# Patient Record
Sex: FEMALE | Race: WHITE | NOT HISPANIC OR LATINO | Employment: OTHER | URBAN - NONMETROPOLITAN AREA
[De-identification: names, ages, dates, MRNs, and addresses within clinical notes are randomized per-mention and may not be internally consistent; named-entity substitution may affect disease eponyms.]

---

## 2017-04-25 ENCOUNTER — OFFICE VISIT (OUTPATIENT)
Dept: ORTHOPEDIC SURGERY | Facility: CLINIC | Age: 37
End: 2017-04-25

## 2017-04-25 VITALS
WEIGHT: 276 LBS | HEIGHT: 66 IN | SYSTOLIC BLOOD PRESSURE: 128 MMHG | BODY MASS INDEX: 44.36 KG/M2 | DIASTOLIC BLOOD PRESSURE: 74 MMHG

## 2017-04-25 DIAGNOSIS — R20.0 BILATERAL HAND NUMBNESS: Primary | ICD-10-CM

## 2017-04-25 DIAGNOSIS — G56.22 ENTRAPMENT OF LEFT ULNAR NERVE: ICD-10-CM

## 2017-04-25 DIAGNOSIS — G56.21 ENTRAPMENT OF RIGHT ULNAR NERVE: ICD-10-CM

## 2017-04-25 DIAGNOSIS — G56.03 BILATERAL CARPAL TUNNEL SYNDROME: ICD-10-CM

## 2017-04-25 PROCEDURE — 99203 OFFICE O/P NEW LOW 30 MIN: CPT | Performed by: ORTHOPAEDIC SURGERY

## 2017-04-25 RX ORDER — CETIRIZINE HYDROCHLORIDE 10 MG/1
TABLET ORAL
Refills: 2 | COMMUNITY
Start: 2017-02-03 | End: 2017-07-14

## 2017-04-25 RX ORDER — FLUTICASONE PROPIONATE 50 MCG
SPRAY, SUSPENSION (ML) NASAL
Refills: 5 | COMMUNITY
Start: 2017-02-03 | End: 2017-07-14 | Stop reason: SDUPTHER

## 2017-04-25 RX ORDER — CEFDINIR 300 MG/1
CAPSULE ORAL
Refills: 0 | COMMUNITY
Start: 2017-01-16 | End: 2017-07-14

## 2017-04-25 RX ORDER — LISINOPRIL 10 MG/1
10 TABLET ORAL DAILY
COMMUNITY
Start: 2017-03-29

## 2017-04-25 NOTE — PROGRESS NOTES
"Subjective   Erica Kan is a 36 y.o. female. 1980- Bilateral hand- \"numbness, stinging and burning\"        History of Present Illness   Patient is here for consult- bilateral hand. Patient states that 5-6 years ago she had a bilateral hand EMG conduction study that showed bilateral carpal tunnel but was not able to proceed with surgery at that time. Patient states that last week while at the park with her grandchildren she placed her right hand on her hip and her right hand \"instantly became numb\". Patient states that since that day the feeling in her last 3 digits have been \"numb with sharp shooting pains\" that go up to her right elbow. Patient states that at night her bilateral shoulder/arms become numb. Patient states that she has tried wearing bilateral hand braces at night but did not bring any relief. Patient would like to be referred to Neurology for EMG to bilateral hands in order to proceed with surgery.     The following portions of the patient's history were reviewed and updated as appropriate:   She  has a past medical history of Gout and Hypertension.  She  does not have any pertinent problems on file.  She  has a past surgical history that includes Appendectomy; Cholecystectomy; and  section, classic.  Her family history includes Allergies in her father and mother; Cholelithiasis in her mother; Diabetes in her mother; Hypertension in her mother.  She  reports that she has been smoking Cigarettes.  She has a 9.50 pack-year smoking history. She has never used smokeless tobacco. She reports that she does not drink alcohol or use illicit drugs.  Current Outpatient Prescriptions   Medication Sig Dispense Refill   • cefdinir (OMNICEF) 300 MG capsule TAKE 1 CAPSULE BY MOUTH TWICE A DAY  0   • cetirizine (zyrTEC) 10 MG tablet TAKE 1 TABLET 1 TIME PER DAY  2   • fluticasone (FLONASE) 50 MCG/ACT nasal spray USE 1 SPRAY 1 TIME PER DAY  5   • lisinopril (PRINIVIL,ZESTRIL) 10 MG tablet      • " "metoprolol tartrate (LOPRESSOR) 25 MG tablet        No current facility-administered medications for this visit.      No current outpatient prescriptions on file prior to visit.     No current facility-administered medications on file prior to visit.      She has No Known Allergies..    Review of Systems  REVIEW OF SYSTEMS:  Negative, other than presenting complaint.  HEENT: No headaches, diplopia, blurred vision, tinnitus, vertigo, epistaxis, hoarseness or sore throat.  Pulmonary: No cough, sputum, hemoptysis, dyspnea, wheezing, or chest pain.  Cardiac: No chest pain, palpitations, orthopnea, paroxysmal nocturnal dyspnea, shortness of breath, or pedal edema.  Gastrointestinal: No diarrhea, melena, or constipation.  Genitourinary: No dysuria, hematuria, nocturia, frequency, bladder or bowel incontinence.  Hematology: No history of any anemia, fatigue, fever, or chills or night sweats.  Dermatology: No rashes, pruritus, or increased pigmentation changes of the skin.     Objective   Physical Exam  /74 (BP Location: Right arm, Patient Position: Sitting)  Ht 66\" (167.6 cm)  Wt 276 lb (125 kg)  BMI 44.55 kg/m2    Social History     Social History   • Marital status:      Spouse name: N/A   • Number of children: N/A   • Years of education: N/A     Occupational History   • Not on file.     Social History Main Topics   • Smoking status: Current Every Day Smoker     Packs/day: 0.50     Years: 19.00     Types: Cigarettes   • Smokeless tobacco: Never Used   • Alcohol use No   • Drug use: No   • Sexual activity: Defer     Other Topics Concern   • Not on file     Social History Narrative   • No narrative on file       HEENT: Normocephalic.  PERRLA.  TM's clear bilaterally.  Oropharynx: Clear.  Neck: Supple, with no adenopathy.  Chest: Equal bilateral expansion.  Clear to auscultation and percussion.  Heart: Regular sinus rhythm, S1 and S2 normal.  No murmurs or extra heart sounds heard.  Abdomen: Soft, nontender, " and no organomegaly.  Neurological: cranial nerves II-XII normal Vascular: pulses are present  Dermatological: no rashes  or blemishes, or any abnormality of the skin.    Examination of bilateral hand shows bilateral positive tinel sign. And positive compression tests bilateral . There is also  A tinel sign at the rt elbow.          Ambulatory referral to Neurology- Dr. Devine's office will be sent for- EMG bilateral hand with ulnar nerve compression. Patient is to return to my office after the EMG has been obtained to discuss possible treatment options. proflex splints given.     Assessment/Plan   Problems Addressed this Visit        Nervous and Auditory    Bilateral carpal tunnel syndrome    Relevant Orders    Ambulatory Referral to Neurology (Completed)    Bilateral hand numbness - Primary    Relevant Orders    Ambulatory Referral to Neurology (Completed)    Entrapment of right ulnar nerve    Relevant Orders    Ambulatory Referral to Neurology (Completed)    Entrapment of left ulnar nerve    Relevant Orders    Ambulatory Referral to Neurology (Completed)

## 2017-06-29 ENCOUNTER — OFFICE VISIT (OUTPATIENT)
Dept: ORTHOPEDIC SURGERY | Facility: CLINIC | Age: 37
End: 2017-06-29

## 2017-06-29 VITALS — HEIGHT: 66 IN | BODY MASS INDEX: 45.16 KG/M2 | WEIGHT: 281 LBS

## 2017-06-29 DIAGNOSIS — G56.22 ENTRAPMENT OF LEFT ULNAR NERVE: ICD-10-CM

## 2017-06-29 DIAGNOSIS — G56.21 ENTRAPMENT OF RIGHT ULNAR NERVE: ICD-10-CM

## 2017-06-29 DIAGNOSIS — G56.03 BILATERAL CARPAL TUNNEL SYNDROME: Primary | ICD-10-CM

## 2017-06-29 DIAGNOSIS — R20.0 BILATERAL HAND NUMBNESS: ICD-10-CM

## 2017-06-29 PROCEDURE — 99214 OFFICE O/P EST MOD 30 MIN: CPT | Performed by: ORTHOPAEDIC SURGERY

## 2017-06-29 RX ORDER — CYCLOBENZAPRINE HCL 5 MG
TABLET ORAL
Refills: 0 | COMMUNITY
Start: 2017-06-21 | End: 2017-07-14

## 2017-06-29 RX ORDER — IBUPROFEN 800 MG/1
TABLET ORAL
Refills: 0 | COMMUNITY
Start: 2017-06-21 | End: 2017-07-14

## 2017-06-29 NOTE — H&P
"Erica Kan is a 36 y.o. female returns for     Chief Complaint   Patient presents with   • Left Hand - Follow-up   • Right Hand - Follow-up   • Results     EMG-NCS       HISTORY OF PRESENT ILLNESS: Patient would like to schedule bilat carpal tunnel release.       CONCURRENT MEDICAL HISTORY:    Past Medical History:   Diagnosis Date   • Gout    • Hypertension        No Known Allergies      Current Outpatient Prescriptions:   •  cefdinir (OMNICEF) 300 MG capsule, TAKE 1 CAPSULE BY MOUTH TWICE A DAY, Disp: , Rfl: 0  •  cetirizine (zyrTEC) 10 MG tablet, TAKE 1 TABLET 1 TIME PER DAY, Disp: , Rfl: 2  •  cyclobenzaprine (FLEXERIL) 5 MG tablet, TAKE 1 AND 1/2 TABLETS BY MOUTH THREE TIMES DAILY AS NEEDED., Disp: , Rfl: 0  •  fluticasone (FLONASE) 50 MCG/ACT nasal spray, USE 1 SPRAY 1 TIME PER DAY, Disp: , Rfl: 5  •  ibuprofen (ADVIL,MOTRIN) 800 MG tablet, 1 TABLET BY MOUTH THREE TIMES A DAY, Disp: , Rfl: 0  •  lisinopril (PRINIVIL,ZESTRIL) 10 MG tablet, , Disp: , Rfl:   •  metoprolol tartrate (LOPRESSOR) 25 MG tablet, , Disp: , Rfl:     Past Surgical History:   Procedure Laterality Date   • APPENDECTOMY     •  SECTION      3   • CHOLECYSTECTOMY         ROS  No fevers or chills.  No chest pain or shortness of air.  No GI or  disturbances.    PHYSICAL EXAMINATION:       Ht 66\" (167.6 cm)  Wt 281 lb (127 kg)  BMI 45.35 kg/m2    Physical Exam    Ht 66\" (167.6 cm)  Wt 281 lb (127 kg)  BMI 45.35 kg/m2    Social History     Social History   • Marital status:      Spouse name: N/A   • Number of children: N/A   • Years of education: N/A     Occupational History   • Not on file.     Social History Main Topics   • Smoking status: Current Every Day Smoker     Packs/day: 0.50     Years: 19.00     Types: Cigarettes   • Smokeless tobacco: Never Used   • Alcohol use No   • Drug use: No   • Sexual activity: Defer     Other Topics Concern   • Not on file     Social History Narrative       HEENT: Normocephalic.  PERRLA.  " TM's clear bilaterally.  Oropharynx: Clear.  Neck: Supple, with no adenopathy.  Chest: Equal bilateral expansion.  Clear to auscultation and percussion.  Heart: Regular sinus rhythm, S1 and S2 normal.  No murmurs or extra heart sounds heard.  Abdomen: Soft, nontender, and no organomegaly.  Neurological: cranial nerves II-XII normal Vascular: pulses are present  Dermatological: no rashes  or blemishes, or any abnormality of the skin.          GAIT:     []  Normal  []  Antalgic    Assistive device: []  None  []  Walker     []  Crutches  []  Cane     []  Wheelchair  []  Stretcher    Ortho Exam   Positive tinel at the wrist , positive compression test. Abductor pollicis moderately weak. Capillary refill intact.  Findings present in both  Wrists.       EMG-NCS conclusion:  Moderately severe bilateral carpal tunnel syndrome. Suggest clinical correlation.        ASSESSMENT:    Diagnoses and all orders for this visit:    Bilateral carpal tunnel syndrome    Bilateral hand numbness    Entrapment of right ulnar nerve    Entrapment of left ulnar nerve    Other orders  -     cyclobenzaprine (FLEXERIL) 5 MG tablet; TAKE 1 AND 1/2 TABLETS BY MOUTH THREE TIMES DAILY AS NEEDED.  -     ibuprofen (ADVIL,MOTRIN) 800 MG tablet; 1 TABLET BY MOUTH THREE TIMES A DAY          PLAN release of rt carpal tunnel since rt hand is more symptomatic.  SURGERY RISKS    Procedure has been explained to the patient.  Risks and benefits have been explained, including the risk of bleeding, stiffness, recurrent problems, blood vessel or nerve injury, blood clots, infection, and lack of healing.  Patient understands and agrees to the procedure, and we will proceed ahead with this, as an outpatient under general anesthesia, on    7/19/17      No Follow-up on file.    Thompson Rose MD

## 2017-07-14 ENCOUNTER — APPOINTMENT (OUTPATIENT)
Dept: PREADMISSION TESTING | Facility: HOSPITAL | Age: 37
End: 2017-07-14

## 2017-07-14 VITALS
SYSTOLIC BLOOD PRESSURE: 138 MMHG | BODY MASS INDEX: 45 KG/M2 | WEIGHT: 280 LBS | RESPIRATION RATE: 16 BRPM | OXYGEN SATURATION: 99 % | HEART RATE: 67 BPM | DIASTOLIC BLOOD PRESSURE: 78 MMHG | HEIGHT: 66 IN

## 2017-07-14 PROCEDURE — 93005 ELECTROCARDIOGRAM TRACING: CPT

## 2017-07-14 PROCEDURE — 93010 ELECTROCARDIOGRAM REPORT: CPT | Performed by: INTERNAL MEDICINE

## 2017-07-14 RX ORDER — PHENTERMINE HYDROCHLORIDE 37.5 MG/1
37.5 CAPSULE ORAL EVERY MORNING
COMMUNITY
End: 2019-11-27 | Stop reason: HOSPADM

## 2017-07-14 RX ORDER — SODIUM CHLORIDE, SODIUM GLUCONATE, SODIUM ACETATE, POTASSIUM CHLORIDE, AND MAGNESIUM CHLORIDE 526; 502; 368; 37; 30 MG/100ML; MG/100ML; MG/100ML; MG/100ML; MG/100ML
1000 INJECTION, SOLUTION INTRAVENOUS CONTINUOUS PRN
Status: CANCELLED | OUTPATIENT
Start: 2017-07-19

## 2017-07-14 NOTE — DISCHARGE INSTRUCTIONS
[facility]  Pre-op Information and Guidelines    You will be called after 2 p.m. the day before your surgery (Friday for Monday surgery) and notified of your time for arrival and approximate surgery time.  If you have not received a call by 4P.M., please contact Same Day Surgery at [phone] of if outside [facility] call [phone].    Please Follow these Important Safety Guidelines:    • The morning of your procedure, take only the medications listed below with   A sip of water:_____________________________________________       ______________________________________________    • DO NOT eat or drink anything after 12:00 midnight the night before surgery  Specific instructions concerning drinking clear liquids will be discussed during  the pre-surgery instruction call the day before your surgery.    • If you take a blood thinner (ex. Plavix, Coumadin, aspirin), ask your doctor when to stop it before surgery  STOP DATE: _________________    • Only 2 visitors are allowed in patient rooms at a time  Your visitors will be asked to wait in the lobby until the admission process is complete with the exception of a parent with a child and patients in need of special assistance.    • YOU CANNOT DRIVE YOURSELF HOME  You must be accompanied by someone who will be responsible for driving you home after surgery and for your care at home.    • DO NOT chew gum, use breath mints, hard candy, or smoke the day of surgery  • DO NOT drink alcohol for at least 24 hours before your surgery  • DO NOT wear any jewelry and remove all body piercing before coming to the hospital  • DO NOT wear make-up to the hospital  • If you are having surgery on an extremity (arm/leg/foot) remove nail polish/artificial nails on the surgical side  • Clothing, glasses, contacts, dentures, and hairpieces must be removed before surgery  • Bathe the night before or the morning of your surgery and do not use powders/lotions on  skin.

## 2017-07-14 NOTE — PAT
Read EKG printout to DR. Huerta over the phone also informed him that patient states she was having palpations in March saw a heart doctor Dr. Panchito Maier that they did an echo and stress test and the only thing he found was the wall of the heart thickened, told her she was ok and did not have to return for a follow up visit, no further orders or instructions per Dr. Huerta may proceed with scheduled surgery.

## 2017-07-19 ENCOUNTER — ANESTHESIA (OUTPATIENT)
Dept: PERIOP | Facility: HOSPITAL | Age: 37
End: 2017-07-19

## 2017-07-19 ENCOUNTER — HOSPITAL ENCOUNTER (OUTPATIENT)
Facility: HOSPITAL | Age: 37
Setting detail: HOSPITAL OUTPATIENT SURGERY
Discharge: HOME | End: 2017-07-19
Attending: ORTHOPAEDIC SURGERY | Admitting: ORTHOPAEDIC SURGERY

## 2017-07-19 ENCOUNTER — ANESTHESIA EVENT (OUTPATIENT)
Dept: PERIOP | Facility: HOSPITAL | Age: 37
End: 2017-07-19

## 2017-07-19 VITALS
SYSTOLIC BLOOD PRESSURE: 161 MMHG | WEIGHT: 278 LBS | HEIGHT: 66 IN | OXYGEN SATURATION: 95 % | DIASTOLIC BLOOD PRESSURE: 91 MMHG | BODY MASS INDEX: 44.68 KG/M2 | HEART RATE: 71 BPM | TEMPERATURE: 96.9 F | RESPIRATION RATE: 18 BRPM

## 2017-07-19 DIAGNOSIS — G56.03 BILATERAL CARPAL TUNNEL SYNDROME: ICD-10-CM

## 2017-07-19 PROCEDURE — 25010000002 FENTANYL CITRATE (PF) 100 MCG/2ML SOLUTION: Performed by: NURSE ANESTHETIST, CERTIFIED REGISTERED

## 2017-07-19 PROCEDURE — 64721 CARPAL TUNNEL SURGERY: CPT | Performed by: ORTHOPAEDIC SURGERY

## 2017-07-19 PROCEDURE — 25010000002 PROPOFOL 10 MG/ML EMULSION: Performed by: NURSE ANESTHETIST, CERTIFIED REGISTERED

## 2017-07-19 PROCEDURE — 25010000003 CEFAZOLIN PER 500 MG: Performed by: ORTHOPAEDIC SURGERY

## 2017-07-19 PROCEDURE — 25010000002 ONDANSETRON PER 1 MG: Performed by: NURSE ANESTHETIST, CERTIFIED REGISTERED

## 2017-07-19 PROCEDURE — 25010000002 DEXAMETHASONE PER 1 MG: Performed by: NURSE ANESTHETIST, CERTIFIED REGISTERED

## 2017-07-19 PROCEDURE — 25010000002 MIDAZOLAM PER 1 MG: Performed by: NURSE ANESTHETIST, CERTIFIED REGISTERED

## 2017-07-19 PROCEDURE — 25010000002 HYDROMORPHONE PER 4 MG: Performed by: NURSE ANESTHETIST, CERTIFIED REGISTERED

## 2017-07-19 RX ORDER — LABETALOL HYDROCHLORIDE 5 MG/ML
5 INJECTION, SOLUTION INTRAVENOUS
Status: DISCONTINUED | OUTPATIENT
Start: 2017-07-19 | End: 2017-07-19 | Stop reason: HOSPADM

## 2017-07-19 RX ORDER — ACETAMINOPHEN 650 MG/1
650 SUPPOSITORY RECTAL ONCE AS NEEDED
Status: DISCONTINUED | OUTPATIENT
Start: 2017-07-19 | End: 2017-07-19 | Stop reason: HOSPADM

## 2017-07-19 RX ORDER — NALOXONE HCL 0.4 MG/ML
0.2 VIAL (ML) INJECTION AS NEEDED
Status: DISCONTINUED | OUTPATIENT
Start: 2017-07-19 | End: 2017-07-19 | Stop reason: HOSPADM

## 2017-07-19 RX ORDER — PROPOFOL 10 MG/ML
VIAL (ML) INTRAVENOUS AS NEEDED
Status: DISCONTINUED | OUTPATIENT
Start: 2017-07-19 | End: 2017-07-19 | Stop reason: SURG

## 2017-07-19 RX ORDER — HYDROCODONE BITARTRATE AND ACETAMINOPHEN 7.5; 325 MG/1; MG/1
1 TABLET ORAL EVERY 6 HOURS PRN
Qty: 40 TABLET | Refills: 0 | Status: SHIPPED | OUTPATIENT
Start: 2017-07-19 | End: 2019-11-27 | Stop reason: HOSPADM

## 2017-07-19 RX ORDER — DEXAMETHASONE SODIUM PHOSPHATE 4 MG/ML
INJECTION, SOLUTION INTRA-ARTICULAR; INTRALESIONAL; INTRAMUSCULAR; INTRAVENOUS; SOFT TISSUE AS NEEDED
Status: DISCONTINUED | OUTPATIENT
Start: 2017-07-19 | End: 2017-07-19 | Stop reason: SURG

## 2017-07-19 RX ORDER — ACETAMINOPHEN 325 MG/1
650 TABLET ORAL ONCE AS NEEDED
Status: DISCONTINUED | OUTPATIENT
Start: 2017-07-19 | End: 2017-07-19 | Stop reason: HOSPADM

## 2017-07-19 RX ORDER — ONDANSETRON 2 MG/ML
4 INJECTION INTRAMUSCULAR; INTRAVENOUS ONCE AS NEEDED
Status: DISCONTINUED | OUTPATIENT
Start: 2017-07-19 | End: 2017-07-19 | Stop reason: HOSPADM

## 2017-07-19 RX ORDER — BACITRACIN 50000 [IU]/1
INJECTION, POWDER, FOR SOLUTION INTRAMUSCULAR AS NEEDED
Status: DISCONTINUED | OUTPATIENT
Start: 2017-07-19 | End: 2017-07-19 | Stop reason: HOSPADM

## 2017-07-19 RX ORDER — SODIUM CHLORIDE, SODIUM GLUCONATE, SODIUM ACETATE, POTASSIUM CHLORIDE, AND MAGNESIUM CHLORIDE 526; 502; 368; 37; 30 MG/100ML; MG/100ML; MG/100ML; MG/100ML; MG/100ML
1000 INJECTION, SOLUTION INTRAVENOUS CONTINUOUS PRN
Status: DISCONTINUED | OUTPATIENT
Start: 2017-07-19 | End: 2017-07-19 | Stop reason: HOSPADM

## 2017-07-19 RX ORDER — MIDAZOLAM HYDROCHLORIDE 1 MG/ML
INJECTION INTRAMUSCULAR; INTRAVENOUS AS NEEDED
Status: DISCONTINUED | OUTPATIENT
Start: 2017-07-19 | End: 2017-07-19 | Stop reason: SURG

## 2017-07-19 RX ORDER — LIDOCAINE HYDROCHLORIDE 20 MG/ML
INJECTION, SOLUTION INFILTRATION; PERINEURAL AS NEEDED
Status: DISCONTINUED | OUTPATIENT
Start: 2017-07-19 | End: 2017-07-19 | Stop reason: SURG

## 2017-07-19 RX ORDER — FLUMAZENIL 0.1 MG/ML
0.2 INJECTION INTRAVENOUS AS NEEDED
Status: DISCONTINUED | OUTPATIENT
Start: 2017-07-19 | End: 2017-07-19 | Stop reason: HOSPADM

## 2017-07-19 RX ORDER — BACTERIOSTATIC SODIUM CHLORIDE 0.9 %
VIAL (ML) INJECTION AS NEEDED
Status: DISCONTINUED | OUTPATIENT
Start: 2017-07-19 | End: 2017-07-19 | Stop reason: HOSPADM

## 2017-07-19 RX ORDER — ONDANSETRON 2 MG/ML
INJECTION INTRAMUSCULAR; INTRAVENOUS AS NEEDED
Status: DISCONTINUED | OUTPATIENT
Start: 2017-07-19 | End: 2017-07-19 | Stop reason: SURG

## 2017-07-19 RX ORDER — FENTANYL CITRATE 50 UG/ML
INJECTION, SOLUTION INTRAMUSCULAR; INTRAVENOUS AS NEEDED
Status: DISCONTINUED | OUTPATIENT
Start: 2017-07-19 | End: 2017-07-19 | Stop reason: SURG

## 2017-07-19 RX ORDER — EPHEDRINE SULFATE 50 MG/ML
5 INJECTION, SOLUTION INTRAVENOUS ONCE AS NEEDED
Status: DISCONTINUED | OUTPATIENT
Start: 2017-07-19 | End: 2017-07-19 | Stop reason: HOSPADM

## 2017-07-19 RX ADMIN — FENTANYL CITRATE 50 MCG: 50 INJECTION, SOLUTION INTRAMUSCULAR; INTRAVENOUS at 10:17

## 2017-07-19 RX ADMIN — LIDOCAINE HYDROCHLORIDE 100 MG: 20 INJECTION, SOLUTION INFILTRATION; PERINEURAL at 09:50

## 2017-07-19 RX ADMIN — ONDANSETRON 4 MG: 2 INJECTION INTRAMUSCULAR; INTRAVENOUS at 10:34

## 2017-07-19 RX ADMIN — HYDROMORPHONE HYDROCHLORIDE 0.5 MG: 1 INJECTION, SOLUTION INTRAMUSCULAR; INTRAVENOUS; SUBCUTANEOUS at 11:44

## 2017-07-19 RX ADMIN — HYDROMORPHONE HYDROCHLORIDE 0.5 MG: 1 INJECTION, SOLUTION INTRAMUSCULAR; INTRAVENOUS; SUBCUTANEOUS at 11:49

## 2017-07-19 RX ADMIN — DEXAMETHASONE SODIUM PHOSPHATE 4 MG: 4 INJECTION, SOLUTION INTRAMUSCULAR; INTRAVENOUS at 09:57

## 2017-07-19 RX ADMIN — FENTANYL CITRATE 50 MCG: 50 INJECTION, SOLUTION INTRAMUSCULAR; INTRAVENOUS at 09:50

## 2017-07-19 RX ADMIN — PROPOFOL 150 MG: 10 INJECTION, EMULSION INTRAVENOUS at 09:50

## 2017-07-19 RX ADMIN — PROPOFOL 50 MG: 10 INJECTION, EMULSION INTRAVENOUS at 10:21

## 2017-07-19 RX ADMIN — CEFAZOLIN SODIUM 2 G: 1 INJECTION, POWDER, FOR SOLUTION INTRAMUSCULAR; INTRAVENOUS at 09:54

## 2017-07-19 RX ADMIN — HYDROMORPHONE HYDROCHLORIDE 0.5 MG: 1 INJECTION, SOLUTION INTRAMUSCULAR; INTRAVENOUS; SUBCUTANEOUS at 11:24

## 2017-07-19 RX ADMIN — HYDROMORPHONE HYDROCHLORIDE 0.5 MG: 1 INJECTION, SOLUTION INTRAMUSCULAR; INTRAVENOUS; SUBCUTANEOUS at 11:14

## 2017-07-19 RX ADMIN — SODIUM CHLORIDE, SODIUM GLUCONATE, SODIUM ACETATE, POTASSIUM CHLORIDE, AND MAGNESIUM CHLORIDE 1000 ML: 526; 502; 368; 37; 30 INJECTION, SOLUTION INTRAVENOUS at 07:39

## 2017-07-19 RX ADMIN — FENTANYL CITRATE 50 MCG: 50 INJECTION, SOLUTION INTRAMUSCULAR; INTRAVENOUS at 09:57

## 2017-07-19 RX ADMIN — FENTANYL CITRATE 50 MCG: 50 INJECTION, SOLUTION INTRAMUSCULAR; INTRAVENOUS at 10:21

## 2017-07-19 RX ADMIN — MIDAZOLAM 2 MG: 1 INJECTION INTRAMUSCULAR; INTRAVENOUS at 09:40

## 2017-07-19 NOTE — ANESTHESIA PROCEDURE NOTES
Airway  Urgency: elective    Airway not difficult    General Information and Staff    Patient location during procedure: OR  CRNA: LOGAN MARTINS    Indications and Patient Condition  Indications for airway management: airway protection    Preoxygenated: yes  Mask difficulty assessment: 0 - not attempted    Final Airway Details  Final airway type: supraglottic airway      Successful airway: classic  Size 4    Number of attempts at approach: 1

## 2017-07-19 NOTE — ANESTHESIA PREPROCEDURE EVALUATION
Anesthesia Evaluation     Patient summary reviewed and Nursing notes reviewed   NPO Solid Status: > 8 hours       Airway   Mallampati: II  TM distance: >3 FB  Neck ROM: full  Dental - normal exam     Pulmonary - normal exam    breath sounds clear to auscultation  (+) a smoker (19 pack years) Current, sleep apnea,   Cardiovascular - normal exam    Rhythm: regular  Rate: normal    (+) hypertension,   (-) angina, orthopnea, PND, FRANK      Neuro/Psych  (+) headaches (Migraine), psychiatric history Anxiety,    GI/Hepatic/Renal/Endo    (+) morbid obesity,     Musculoskeletal (-) negative ROS    Abdominal    Substance History - negative use     OB/GYN negative ob/gyn ROS         Other - negative ROS                                       Anesthesia Plan    ASA 3     general     intravenous induction   Anesthetic plan and risks discussed with patient.

## 2017-07-19 NOTE — ANESTHESIA POSTPROCEDURE EVALUATION
Patient: Erica Kan    Procedure Summary     Date Anesthesia Start Anesthesia Stop Room / Location    07/19/17 0944 1101 [facility] OR 12 / [facility] OR       Procedure Diagnosis Surgeon Provider    RELEASE OF RIGHT CARPAL TUNNEL (Right Wrist) Bilateral carpal tunnel syndrome  (Bilateral carpal tunnel syndrome [G56.03]) MD Mulugeta Field MD          Anesthesia Type: general  Last vitals  BP   170/93 (07/19/17 1159)    Temp   96.9 °F (36.1 °C) (07/19/17 1159)    Pulse   70 (07/19/17 1159)   Resp   18 (07/19/17 1159)    SpO2   95 % (07/19/17 1159)      Post Anesthesia Care and Evaluation    Patient location during evaluation: PACU  Patient participation: complete - patient participated  Level of consciousness: awake and alert  Pain management: adequate  Airway patency: patent  Anesthetic complications: No anesthetic complications  PONV Status: none  Cardiovascular status: acceptable  Respiratory status: acceptable  Hydration status: acceptable

## 2017-07-19 NOTE — PLAN OF CARE
Problem: Patient Care Overview (Adult)  Goal: Plan of Care Review  Outcome: Ongoing (interventions implemented as appropriate)    07/19/17 1136   Coping/Psychosocial Response Interventions   Plan Of Care Reviewed With patient   Patient Care Overview   Progress improving   Outcome Evaluation   Outcome Summary/Follow up Plan vss, reports decreased pain, skin pink warm and dry no resp distress, no distress noted, ready for transfer to phase2         Problem: Perioperative Period (Adult)  Goal: Signs and Symptoms of Listed Potential Problems Will be Absent or Manageable (Perioperative Period)  Outcome: Ongoing (interventions implemented as appropriate)

## 2017-07-19 NOTE — OP NOTE
CARPAL TUNNEL RELEASE  Procedure Note    Name:    Erica Kan  YOB: 1980  Date of surgery:   7/19/2017    Pre-op Diagnosis:   Bilateral carpal tunnel syndrome [G56.03]    Post-op Diagnosis:    Post-Op Diagnosis Codes:     * Bilateral carpal tunnel syndrome [G56.03]    Procedure:  Procedure(s):  RELEASE OF RIGHT CARPAL TUNNEL    Surgeon:  Surgeon(s):  Thompson Rose MD    ASSISTANT:  Joey hernandez CSA    Anesthesia: General    Staff:   Circulator: Gladys Jiang RN  Scrub Person: Faisal Aleman  Assistant: Hiren Hernandez    Estimated Blood Loss: *No blood loss documented*    Specimens:            None          Drains: none            Findings:  Rt median nerve compression at the wrist.      Complications: None    IMPLANTS:   Nothing was implanted during the procedure      PROCEDURE:After satisfactory anesthesia was accomplished, the rt hand was prepped and draped, and the tourniquet elevated to 250 mm of hg pressure, then an incision was made in the palm of the hand, dissecting thru subcutaneous tissue, down to the transverse  Carpal ligament, which was noted to be hypertrophic, and the ligament was incised,  Thus decompressing the carpal tunnel and the flexor retinaculum was also released, the tourniquet was le3 t down and the wound irrigated with normal saline, hemostasis obtained and the wound closed with 4-0 vicryl  And the skin with 4-0nylon. Dressing placewd over the wound and procedure terminated, patient left the O. R. In satisfactory condition.          Thompson Rose MD     Date: 7/19/2017  Time: 11:12 AM

## 2017-07-19 NOTE — SIGNIFICANT NOTE
"B/p >20% admission, spoke with , advised pt insist she wants to be d/c'd because she \"needs a cigarette\" pt pink/alert, tolerating fluids, denies pain, has voided, no s/s distress   "

## 2017-08-01 ENCOUNTER — OFFICE VISIT (OUTPATIENT)
Dept: ORTHOPEDIC SURGERY | Facility: CLINIC | Age: 37
End: 2017-08-01

## 2017-08-01 VITALS
WEIGHT: 272 LBS | BODY MASS INDEX: 43.71 KG/M2 | HEIGHT: 66 IN | SYSTOLIC BLOOD PRESSURE: 142 MMHG | DIASTOLIC BLOOD PRESSURE: 74 MMHG

## 2017-08-01 DIAGNOSIS — G56.03 BILATERAL CARPAL TUNNEL SYNDROME: Primary | ICD-10-CM

## 2017-08-01 DIAGNOSIS — G56.21 ENTRAPMENT OF RIGHT ULNAR NERVE: ICD-10-CM

## 2017-08-01 PROCEDURE — 99024 POSTOP FOLLOW-UP VISIT: CPT | Performed by: ORTHOPAEDIC SURGERY

## 2017-08-15 NOTE — PROGRESS NOTES
Patient was contacted and informed that it was okay to wash area where the steri-strips were. Paitent was instructed to NOT immerse wrist in water. Paitent will return to office on 8/29/2017 for recheck of the wrist.     CFB  8/15/2017

## 2017-08-29 ENCOUNTER — OFFICE VISIT (OUTPATIENT)
Dept: ORTHOPEDIC SURGERY | Facility: CLINIC | Age: 37
End: 2017-08-29

## 2017-08-29 VITALS
DIASTOLIC BLOOD PRESSURE: 74 MMHG | BODY MASS INDEX: 43.71 KG/M2 | HEIGHT: 66 IN | WEIGHT: 272 LBS | SYSTOLIC BLOOD PRESSURE: 148 MMHG

## 2017-08-29 DIAGNOSIS — Z98.890 S/P CARPAL TUNNEL RELEASE: Primary | ICD-10-CM

## 2017-08-29 PROCEDURE — 99024 POSTOP FOLLOW-UP VISIT: CPT | Performed by: ORTHOPAEDIC SURGERY

## 2017-09-11 ENCOUNTER — TRANSCRIBE ORDERS (OUTPATIENT)
Dept: PHYSICAL THERAPY | Facility: HOSPITAL | Age: 37
End: 2017-09-11

## 2017-09-11 DIAGNOSIS — Z98.890 S/P CARPAL TUNNEL RELEASE: Primary | ICD-10-CM

## 2018-10-01 NOTE — PROGRESS NOTES
"Subjective   Erica Kan is a 36 y.o. female. 1980- Post op recheck- right carpal tunnel release-       History of Present Illness   Patient is here for recheck of right carpal tunnel release. Surgery date 2017. Patient states that she does not have any discomfort at this time. Patient states that it only hurts \"slightly\" when she tries to use arm.     The following portions of the patient's history were reviewed and updated as appropriate:   She  has a past medical history of Anxiety; Gout; History of MRSA infection; Hypertension; Menstrual cycle problem; Migraine; Sleep apnea; and Wears glasses.  She  does not have any pertinent problems on file.  She  has a past surgical history that includes Appendectomy; Cholecystectomy;  section, classic; Laparoscopic tubal ligation; and Carpal tunnel release (Right, 2017).  Her family history includes Allergies in her father and mother; Cholelithiasis in her mother; Diabetes in her mother; Hypertension in her mother.  She  reports that she has been smoking Cigarettes.  She has a 19.00 pack-year smoking history. She has never used smokeless tobacco. She reports that she does not drink alcohol or use illicit drugs.  Current Outpatient Prescriptions   Medication Sig Dispense Refill   • fluticasone (VERAMYST) 27.5 MCG/SPRAY nasal spray 2 sprays into each nostril Daily As Needed for Rhinitis.     • HYDROcodone-acetaminophen (NORCO) 7.5-325 MG per tablet Take 1 tablet by mouth Every 6 (Six) Hours As Needed for Moderate Pain  or Severe Pain  (Pain). 40 tablet 0   • lisinopril (PRINIVIL,ZESTRIL) 10 MG tablet Take 10 mg by mouth Daily. 12n     • metoprolol tartrate (LOPRESSOR) 25 MG tablet Take 25 mg by mouth 2 (Two) Times a Day.     • phentermine 37.5 MG capsule Take 37.5 mg by mouth Every Morning. Pt last dose was 2017       No current facility-administered medications for this visit.      Current Outpatient Prescriptions on File Prior to Visit " Rx for 14 days sent. Overdue for recheck of labs and due for well child check.   Please help mom schedule. If able, please get labs at least 1 day(s) before visit. See sib encounter.     Thanks,  Electronically signed by Lyndsey Segundo MD.       "  Medication Sig   • fluticasone (VERAMYST) 27.5 MCG/SPRAY nasal spray 2 sprays into each nostril Daily As Needed for Rhinitis.   • HYDROcodone-acetaminophen (NORCO) 7.5-325 MG per tablet Take 1 tablet by mouth Every 6 (Six) Hours As Needed for Moderate Pain  or Severe Pain  (Pain).   • lisinopril (PRINIVIL,ZESTRIL) 10 MG tablet Take 10 mg by mouth Daily. 12n   • metoprolol tartrate (LOPRESSOR) 25 MG tablet Take 25 mg by mouth 2 (Two) Times a Day.   • phentermine 37.5 MG capsule Take 37.5 mg by mouth Every Morning. Pt last dose was 6-     No current facility-administered medications on file prior to visit.      She is allergic to other and benadryl [diphenhydramine hcl (sleep)]..    Review of Systems  REVIEW OF SYSTEMS:  Negative, other than presenting complaint.  HEENT: No headaches, diplopia, blurred vision, tinnitus, vertigo, epistaxis, hoarseness or sore throat.  Pulmonary: No cough, sputum, hemoptysis, dyspnea, wheezing, or chest pain.  Cardiac: No chest pain, palpitations, orthopnea, paroxysmal nocturnal dyspnea, shortness of breath, or pedal edema.  Gastrointestinal: No diarrhea, melena, or constipation.  Genitourinary: No dysuria, hematuria, nocturia, frequency, bladder or bowel incontinence.  Hematology: No history of any anemia, fatigue, fever, or chills or night sweats.  Dermatology: No rashes, pruritus, or increased pigmentation changes of the skin.     Objective   Physical Exam  /74 (BP Location: Left arm, Patient Position: Sitting)  Ht 66\" (167.6 cm)  Wt 272 lb (123 kg)  LMP 12/23/2016 (Exact Date)  BMI 43.9 kg/m2    Social History     Social History   • Marital status:      Spouse name: N/A   • Number of children: N/A   • Years of education: N/A     Occupational History   • Not on file.     Social History Main Topics   • Smoking status: Current Every Day Smoker     Packs/day: 1.00     Years: 19.00     Types: Cigarettes   • Smokeless tobacco: Never Used   • Alcohol use No   • " Drug use: No   • Sexual activity: Defer     Other Topics Concern   • Not on file     Social History Narrative       HEENT: Normocephalic.  PERRLA.  TM's clear bilaterally.  Oropharynx: Clear.  Neck: Supple, with no adenopathy.  Chest: Equal bilateral expansion.  Clear to auscultation and percussion.  Heart: Regular sinus rhythm, S1 and S2 normal.  No murmurs or extra heart sounds heard.  Abdomen: Soft, nontender, and no organomegaly.  Neurological: cranial nerves II-XII normal Vascular: pulses are present  Dermatological: no rashes  or blemishes, or any abnormality of the skin.    Splint to the right wrist was removed. Incision line to the right wrist is well healed. Sutures will be removed.     Allergies verified. Area to the right wrist was prepped in a sterile manner. Sutures to the right wrist were removed without difficulty. Steri-strips were placed along the incision line. Patient was instructed to not get the area wet for the next 48 hours.     Patient's right wrist was placed in a stocking net and wrist control splint. Patient was given Theraputty. Patient was instructed on how to use putty and confirmed all instructions given.     Patient will return to my office in 4 weeks for recheck of the right wrist.     Assessment/Plan   Problems Addressed this Visit        Nervous and Auditory    Bilateral carpal tunnel syndrome - Primary    Entrapment of right ulnar nerve

## 2019-11-22 ENCOUNTER — HOSPITAL ENCOUNTER (EMERGENCY)
Facility: HOSPITAL | Age: 39
Discharge: HOME | End: 2019-11-22
Attending: EMERGENCY MEDICINE | Admitting: EMERGENCY MEDICINE

## 2019-11-22 VITALS
HEIGHT: 65 IN | HEART RATE: 66 BPM | OXYGEN SATURATION: 99 % | DIASTOLIC BLOOD PRESSURE: 67 MMHG | WEIGHT: 266.6 LBS | BODY MASS INDEX: 44.42 KG/M2 | RESPIRATION RATE: 18 BRPM | SYSTOLIC BLOOD PRESSURE: 174 MMHG | TEMPERATURE: 98.4 F

## 2019-11-22 DIAGNOSIS — N93.9 ABNORMAL UTERINE BLEEDING (AUB): Primary | ICD-10-CM

## 2019-11-22 LAB
BACTERIA UR QL AUTO: ABNORMAL /HPF
BASOPHILS # BLD AUTO: 0.05 10*3/MM3 (ref 0–0.2)
BASOPHILS NFR BLD AUTO: 0.5 % (ref 0–1.5)
BILIRUB UR QL STRIP: NEGATIVE
CLARITY UR: CLEAR
COLOR UR: YELLOW
DEPRECATED RDW RBC AUTO: 40.6 FL (ref 37–54)
EOSINOPHIL # BLD AUTO: 0.15 10*3/MM3 (ref 0–0.4)
EOSINOPHIL NFR BLD AUTO: 1.4 % (ref 0.3–6.2)
ERYTHROCYTE [DISTWIDTH] IN BLOOD BY AUTOMATED COUNT: 12.2 % (ref 12.3–15.4)
GLUCOSE UR STRIP-MCNC: NEGATIVE MG/DL
HCG SERPL QL: NEGATIVE
HCT VFR BLD AUTO: 35.5 % (ref 34–46.6)
HGB BLD-MCNC: 12.1 G/DL (ref 12–15.9)
HGB UR QL STRIP.AUTO: ABNORMAL
HOLD SPECIMEN: NORMAL
HYALINE CASTS UR QL AUTO: ABNORMAL /LPF
IMM GRANULOCYTES # BLD AUTO: 0.03 10*3/MM3 (ref 0–0.05)
IMM GRANULOCYTES NFR BLD AUTO: 0.3 % (ref 0–0.5)
KETONES UR QL STRIP: NEGATIVE
LEUKOCYTE ESTERASE UR QL STRIP.AUTO: NEGATIVE
LYMPHOCYTES # BLD AUTO: 2.93 10*3/MM3 (ref 0.7–3.1)
LYMPHOCYTES NFR BLD AUTO: 28.1 % (ref 19.6–45.3)
MCH RBC QN AUTO: 31.2 PG (ref 26.6–33)
MCHC RBC AUTO-ENTMCNC: 34.1 G/DL (ref 31.5–35.7)
MCV RBC AUTO: 91.5 FL (ref 79–97)
MONOCYTES # BLD AUTO: 0.51 10*3/MM3 (ref 0.1–0.9)
MONOCYTES NFR BLD AUTO: 4.9 % (ref 5–12)
NEUTROPHILS # BLD AUTO: 6.77 10*3/MM3 (ref 1.7–7)
NEUTROPHILS NFR BLD AUTO: 64.8 % (ref 42.7–76)
NITRITE UR QL STRIP: NEGATIVE
NRBC BLD AUTO-RTO: 0 /100 WBC (ref 0–0.2)
PH UR STRIP.AUTO: 6.5 [PH] (ref 5–9)
PLATELET # BLD AUTO: 386 10*3/MM3 (ref 140–450)
PMV BLD AUTO: 9.5 FL (ref 6–12)
PROT UR QL STRIP: NEGATIVE
RBC # BLD AUTO: 3.88 10*6/MM3 (ref 3.77–5.28)
RBC # UR: ABNORMAL /HPF
REF LAB TEST METHOD: ABNORMAL
SP GR UR STRIP: 1.01 (ref 1–1.03)
SQUAMOUS #/AREA URNS HPF: ABNORMAL /HPF
UROBILINOGEN UR QL STRIP: ABNORMAL
WBC NRBC COR # BLD: 10.44 10*3/MM3 (ref 3.4–10.8)
WBC UR QL AUTO: ABNORMAL /HPF
WHOLE BLOOD HOLD SPECIMEN: NORMAL

## 2019-11-22 PROCEDURE — 84703 CHORIONIC GONADOTROPIN ASSAY: CPT | Performed by: STUDENT IN AN ORGANIZED HEALTH CARE EDUCATION/TRAINING PROGRAM

## 2019-11-22 PROCEDURE — 85025 COMPLETE CBC W/AUTO DIFF WBC: CPT | Performed by: STUDENT IN AN ORGANIZED HEALTH CARE EDUCATION/TRAINING PROGRAM

## 2019-11-22 PROCEDURE — 81001 URINALYSIS AUTO W/SCOPE: CPT | Performed by: STUDENT IN AN ORGANIZED HEALTH CARE EDUCATION/TRAINING PROGRAM

## 2019-11-22 PROCEDURE — 99283 EMERGENCY DEPT VISIT LOW MDM: CPT

## 2019-11-22 PROCEDURE — 36415 COLL VENOUS BLD VENIPUNCTURE: CPT

## 2019-11-22 RX ORDER — ROPINIROLE 0.5 MG/1
0.5 TABLET, FILM COATED ORAL NIGHTLY
COMMUNITY

## 2019-11-22 NOTE — ED PROVIDER NOTES
Subjective   Ms. Kan is a 39-year-old female with a past medical history of chronic irregular menstrual bleeding who presents with complaints of continuous irregular menstrual bleeding with associated lower abdominal pain and feelings of her vagina and rectum coming out of her.  She is usually amenorrheic for a couple of months then will have a period for 1 month and continues to be amenorrheic.  However, she started bleeding July 17th of this year and that until August 9 then stopped.  She started again August 23 and on September 11 stopped.  She started on September 19 and has been bleeding until today.  She states it is extensive bleeding to the point that it overflows a tampon and a pad at the same time and drips out of her pants when removing them to use the bathroom.  It is bright red blood with significant clots.  Her abdominal pain is in the lower abdomen, does not radiate, constant, alleviated with releasing blood and clots from her vagina.  She has visited multiple providers which usually check if she is anemic and once they find out that she is not anemic his discharge with PCP follow-up.  She has an gynecology who did the same thing.  She was examined by her PCP yesterday and set up with an appointment next week with the women[facility] center but she says she cannot handle her pain anymore and decided to come to the ED today.            Review of Systems   Constitutional: Negative for activity change and appetite change.   HENT: Negative for congestion and trouble swallowing.    Respiratory: Negative for chest tightness and shortness of breath.    Cardiovascular: Negative for chest pain and palpitations.   Gastrointestinal: Positive for abdominal pain. Negative for abdominal distention, anal bleeding, blood in stool, nausea and vomiting.   Genitourinary: Positive for menstrual problem, pelvic pain, vaginal bleeding and vaginal pain. Negative for difficulty urinating and dysuria.   Musculoskeletal: Negative  for arthralgias and myalgias.   Skin: Negative for color change and pallor.   Neurological: Positive for weakness. Negative for dizziness, light-headedness and headaches.   Psychiatric/Behavioral: Negative for agitation and behavioral problems.       Past Medical History:   Diagnosis Date   • Anxiety    • Gout    • History of MRSA infection    • Hypertension    • Menstrual cycle problem     last period    • Migraine    • Sleep apnea    • Wears glasses        Allergies   Allergen Reactions   • Other      MRSA hx   • Benadryl [Diphenhydramine Hcl (Sleep)] Rash     Looked like sunburn pt relates she was reallyhot       Past Surgical History:   Procedure Laterality Date   • APPENDECTOMY     • CARPAL TUNNEL RELEASE Right 2017    Procedure: RELEASE OF RIGHT CARPAL TUNNEL;  Surgeon: Thompson Rose MD;  Location: [facility];  Service:    •  SECTION      3   • CHOLECYSTECTOMY     • LAPAROSCOPIC TUBAL LIGATION         Family History   Problem Relation Age of Onset   • Diabetes Mother    • Hypertension Mother    • Allergies Mother    • Cholelithiasis Mother    • Allergies Father        Social History     Socioeconomic History   • Marital status:      Spouse name: Not on file   • Number of children: Not on file   • Years of education: Not on file   • Highest education level: Not on file   Tobacco Use   • Smoking status: Current Every Day Smoker     Packs/day: 1.00     Years: 19.00     Pack years: 19.00     Types: Cigarettes   • Smokeless tobacco: Never Used   Substance and Sexual Activity   • Alcohol use: No   • Drug use: No   • Sexual activity: Defer           Objective   Physical Exam   Constitutional: She is oriented to person, place, and time. She appears well-developed and well-nourished. No distress.   HENT:   Head: Normocephalic and atraumatic.   Right Ear: External ear normal.   Left Ear: External ear normal.   Eyes: EOM are normal. Pupils are equal, round, and reactive to light.   Neck: Normal  "range of motion. Neck supple.   Cardiovascular: Normal rate, regular rhythm and normal heart sounds. Exam reveals no gallop and no friction rub.   No murmur heard.  Pulmonary/Chest: Effort normal and breath sounds normal. No respiratory distress. She has no wheezes. She has no rales.   Abdominal: Soft. Bowel sounds are normal. She exhibits no distension. There is tenderness (Lower abdomen. Most tender in suprapubic region.).   Musculoskeletal: Normal range of motion. She exhibits no edema.   Neurological: She is alert and oriented to person, place, and time.   Skin: Skin is warm. Capillary refill takes less than 2 seconds. No erythema.   Psychiatric: She has a normal mood and affect. Her behavior is normal.       Procedures           ED Course                  MDM  Number of Diagnoses or Management Options  Abnormal uterine bleeding (AUB): established and worsening  Diagnosis management comments: Ms. Kan is a 39-year-old female who has chronic abnormal uterine bleeding who presents the emergency department today because she \"just needed to have it checked out\".  In the emergency department vital signs were stable.  CBC showed hemoglobin of 12.1 and hematocrit 35.5.  UA was positive for blood, and serum hCG was negative.  She is hemodynamically stable without anemia.  She will be discharged in stable condition with instructions to keep and attend her appointment in the women's center next week for further evaluation and management.       Amount and/or Complexity of Data Reviewed  Clinical lab tests: reviewed and ordered  Discuss the patient with other providers: yes    Risk of Complications, Morbidity, and/or Mortality  Presenting problems: low  Diagnostic procedures: low  Management options: low    Patient Progress  Patient progress: stable      Final diagnoses:   Abnormal uterine bleeding (AUB)           This document has been electronically signed by Mann Morales MD on November 22, 2019 5:23 PM         "   Mann Morales MD  Resident  11/22/19 1786

## 2019-11-27 ENCOUNTER — OFFICE VISIT (OUTPATIENT)
Dept: OBSTETRICS AND GYNECOLOGY | Facility: CLINIC | Age: 39
End: 2019-11-27

## 2019-11-27 VITALS
SYSTOLIC BLOOD PRESSURE: 130 MMHG | BODY MASS INDEX: 43.65 KG/M2 | HEIGHT: 65 IN | WEIGHT: 262 LBS | DIASTOLIC BLOOD PRESSURE: 78 MMHG

## 2019-11-27 DIAGNOSIS — N93.9 ABNORMAL UTERINE BLEEDING (AUB): Primary | ICD-10-CM

## 2019-11-27 PROCEDURE — 58100 BIOPSY OF UTERUS LINING: CPT | Performed by: NURSE PRACTITIONER

## 2019-11-27 PROCEDURE — 96372 THER/PROPH/DIAG INJ SC/IM: CPT | Performed by: NURSE PRACTITIONER

## 2019-11-27 PROCEDURE — 88305 TISSUE EXAM BY PATHOLOGIST: CPT | Performed by: NURSE PRACTITIONER

## 2019-11-27 PROCEDURE — 99203 OFFICE O/P NEW LOW 30 MIN: CPT | Performed by: NURSE PRACTITIONER

## 2019-11-27 PROCEDURE — 88305 TISSUE EXAM BY PATHOLOGIST: CPT | Performed by: PATHOLOGY

## 2019-11-27 RX ORDER — HYDROCHLOROTHIAZIDE 25 MG/1
25 TABLET ORAL DAILY
Refills: 5 | COMMUNITY
Start: 2019-11-15

## 2019-11-27 RX ORDER — MEDROXYPROGESTERONE ACETATE 150 MG/ML
150 INJECTION, SUSPENSION INTRAMUSCULAR ONCE
Status: COMPLETED | OUTPATIENT
Start: 2019-11-27 | End: 2019-11-27

## 2019-11-27 RX ORDER — MEDROXYPROGESTERONE ACETATE 150 MG/ML
150 INJECTION, SUSPENSION INTRAMUSCULAR
Qty: 1 EACH | Refills: 0 | Status: SHIPPED | OUTPATIENT
Start: 2019-11-27 | End: 2020-01-07

## 2019-11-27 RX ORDER — ERGOCALCIFEROL 1.25 MG/1
50000 CAPSULE ORAL WEEKLY
Refills: 2 | COMMUNITY
Start: 2019-10-29 | End: 2020-01-15

## 2019-11-27 RX ADMIN — MEDROXYPROGESTERONE ACETATE 150 MG: 150 INJECTION, SUSPENSION INTRAMUSCULAR at 10:25

## 2019-11-27 NOTE — PROGRESS NOTES
"Subjective   Erica Kan is a 39 y.o. here for abnormal uterine bleeding    LMP: 2019  Pap: 3/2019, normal, per patient at [facility]: Tubal ligation      Pt is  is a referral who presents today for abnormal uterine bleeding since Sep. 19. She reports a PMH of chronic irregular menstrual bleeding for 14 years. She was amenorrheic for 7 months and had her first menses this year was in July. She reports AUB is associated with sharp pain that \"feels like contractions\", and feels \"her vagina and buttocks are going to fall out.\" She has tried tylenol and ibuprofen. Pt presented to the ED on  because of the pain despite being evaluated by her PCP on  and a referral was placed to be seen today. TVUS on  demonstrates small uterine leiomyoma and a right ovarian cyst. Hgb of 12.1 and Hct 35.5 on  in the ER. TSH 2.71 on . Today, pt is only spotting. However, vaginal bleeding was heavy on  till yesterday. She reports using 9-10 maxi overnight pads yesterday, she can at times saturates a pad in 45 minutes to an hour. On , her flow was heavy and had blood running down her legs and all over the bathroom floor. She has previously used birth control pills an depo-provera to manage menses, but it has been years since she has been on hormonal control. She has a hx of tubal ligation. Pt is a daily smoker.     Menstrual Problem   This is a chronic problem. The current episode started more than 1 year ago. The problem has been gradually worsening. Pertinent negatives include no abdominal pain, chest pain, chills, fatigue, fever, nausea, rash or weakness. She has tried NSAIDs and acetaminophen for the symptoms. The treatment provided no relief.       The following portions of the patient's history were reviewed and updated as appropriate: allergies, current medications, past family history, past medical history, past social history, past surgical history and problem list.    Review " of Systems   Constitutional: Negative for chills, fatigue and fever.   Respiratory: Negative for shortness of breath.    Cardiovascular: Negative for chest pain and palpitations.   Gastrointestinal: Negative for abdominal pain, constipation, diarrhea and nausea.   Endocrine: Negative for cold intolerance and heat intolerance.   Genitourinary: Positive for menstrual problem, pelvic pain and vaginal bleeding. Negative for difficulty urinating, dysuria, frequency, pelvic pressure, urinary incontinence, vaginal discharge and vaginal pain.   Skin: Negative for rash.   Neurological: Negative for weakness and headache.   Psychiatric/Behavioral: Negative for sleep disturbance, depressed mood and stress.       Objective   Physical Exam   Constitutional: She is oriented to person, place, and time. She appears well-developed and well-nourished.   HENT:   Head: Normocephalic.   Neck: Normal range of motion. Neck supple.   Cardiovascular: Normal rate and regular rhythm.   Pulmonary/Chest: Effort normal and breath sounds normal.   Abdominal: Soft.   Genitourinary: There is no rash, tenderness, lesion or injury on the right labia. There is no rash, tenderness, lesion or injury on the left labia.   Musculoskeletal: Normal range of motion.   Neurological: She is alert and oriented to person, place, and time.   Skin: Skin is warm and dry.   Psychiatric: She has a normal mood and affect. Her behavior is normal.   Nursing note and vitals reviewed.        Assessment/Plan   Erica was seen today for vaginal bleeding.    Diagnoses and all orders for this visit:    Abnormal uterine bleeding (AUB)  -     Tissue Pathology Exam  -     MedroxyPROGESTERone Acetate (DEPO-PROVERA) injection 150 mg    Other orders  -     medroxyPROGESTERone (DEPO-PROVERA) 150 MG/ML injection; Inject 1 mL into the appropriate muscle as directed by prescriber Every 3 (Three) Months.      Pt signed release of records for PAP test.  Depo-provera for vaginal bleeding  control. Pt to follow-up with Dr. Aquino for a surgery consult.         Endometrial Biopsy    Date of procedure:  12/2/2019    Procedure documentation:    The cervix was grasped anterior at the 2 o'clock and 11 o'clock position.  The cavity sounded to 5 centimeters.  An endometrial biopsy specimen was obtained. The tissue was sent for permanent histopathologic evaluation.  Tenaculum was removed from the cervix with scant bleeding. She tolerated the procedure well.    Post procedure instructions:She was instructed to call us in 1 week's time if she has not heard from us otherwise.  If there is any significant fever or excessive bleeding or pain she is to call immediately.      This note was electronically signed.    Verenice Velazco, NEFTALI  December 2, 2019

## 2019-12-02 LAB
LAB AP CASE REPORT: NORMAL
PATH REPORT.FINAL DX SPEC: NORMAL
PATH REPORT.GROSS SPEC: NORMAL

## 2019-12-10 ENCOUNTER — TELEPHONE (OUTPATIENT)
Dept: OBSTETRICS AND GYNECOLOGY | Facility: CLINIC | Age: 39
End: 2019-12-10

## 2019-12-10 NOTE — TELEPHONE ENCOUNTER
Release for pap was faxed without success of getting pap smear patient notified to bring in a copy of her pap smear with her to her appointment.

## 2019-12-18 ENCOUNTER — OFFICE VISIT (OUTPATIENT)
Dept: OBSTETRICS AND GYNECOLOGY | Facility: CLINIC | Age: 39
End: 2019-12-18

## 2019-12-18 VITALS
SYSTOLIC BLOOD PRESSURE: 115 MMHG | BODY MASS INDEX: 42.05 KG/M2 | DIASTOLIC BLOOD PRESSURE: 82 MMHG | WEIGHT: 252.4 LBS | HEIGHT: 65 IN

## 2019-12-18 DIAGNOSIS — D25.1 INTRAMURAL LEIOMYOMA OF UTERUS: ICD-10-CM

## 2019-12-18 DIAGNOSIS — N93.9 ABNORMAL UTERINE BLEEDING (AUB): Primary | ICD-10-CM

## 2019-12-18 DIAGNOSIS — R10.2 PELVIC PAIN: ICD-10-CM

## 2019-12-18 PROCEDURE — 99213 OFFICE O/P EST LOW 20 MIN: CPT | Performed by: OBSTETRICS & GYNECOLOGY

## 2019-12-21 RX ORDER — SODIUM CHLORIDE 0.9 % (FLUSH) 0.9 %
3 SYRINGE (ML) INJECTION EVERY 12 HOURS SCHEDULED
Status: CANCELLED | OUTPATIENT
Start: 2020-01-20

## 2019-12-21 RX ORDER — SODIUM CHLORIDE, SODIUM LACTATE, POTASSIUM CHLORIDE, CALCIUM CHLORIDE 600; 310; 30; 20 MG/100ML; MG/100ML; MG/100ML; MG/100ML
125 INJECTION, SOLUTION INTRAVENOUS CONTINUOUS
Status: CANCELLED | OUTPATIENT
Start: 2020-01-20

## 2019-12-21 RX ORDER — BUPIVACAINE HCL/0.9 % NACL/PF 0.1 %
2 PLASTIC BAG, INJECTION (ML) EPIDURAL ONCE
Status: CANCELLED | OUTPATIENT
Start: 2020-01-20 | End: 2019-12-21

## 2019-12-21 RX ORDER — SODIUM CHLORIDE 0.9 % (FLUSH) 0.9 %
10 SYRINGE (ML) INJECTION AS NEEDED
Status: CANCELLED | OUTPATIENT
Start: 2020-01-20

## 2019-12-21 NOTE — PROGRESS NOTES
Erica Kan is a 39 y.o. y/o female.     Chief Complaint: Pelvic pain and abnormal uterine bleeding with fibroid uterus.    HPI:   39 y.o. No obstetric history on file..  No LMP recorded. (Menstrual status: Tubal ligation)..  Patient presents for evaluation for possible hysterectomy secondary to heavy bleeding, pelvic pain, and a fibroid uterus.  Patient has tried oral contraceptive pills in the past without relief.  Is currently on Depo-Provera with some relief but not complete relief and does not like the side effects.  Patient has had a cholecystectomy in the past appendectomy and 3 previous C-sections.  Given the surgical history I recommended that patient undergo total abdominal hysterectomy.  Plan for total abdominal hysterectomy with right salpingo-oophorectomy and left salpingectomy on 20 January.  Patient to return in approximately 2 to 3 weeks for preop again patient aware that there is some risk as endometrial sampling was not able to be obtained, she understands these risks and desires to proceed with surgery.     Review of Systems   Constitutional: Negative for chills, fatigue and fever.   HENT: Negative for sore throat.    Eyes: Negative for visual disturbance.   Respiratory: Negative for cough, shortness of breath and wheezing.    Cardiovascular: Negative for chest pain, palpitations and leg swelling.   Gastrointestinal: Negative for abdominal pain, diarrhea, nausea and vomiting.   Genitourinary: Positive for menstrual problem, pelvic pain and vaginal bleeding. Negative for dysuria, flank pain, frequency, vaginal discharge and vaginal pain.   Neurological: Negative for syncope, light-headedness and headaches.   Psychiatric/Behavioral: Negative for dysphoric mood and suicidal ideas. The patient is not nervous/anxious.         The following portions of the patient's history were reviewed and updated as appropriate: allergies, current medications, past family history, past medical history, past social  history, past surgical history and problem list.    Allergies   Allergen Reactions   • Other Other (See Comments)     MRSA hx   • Benadryl [Diphenhydramine Hcl (Sleep)] Rash     Looked like sunburn pt relates she was reallyhot        Prior to Admission medications    Medication Sig Start Date End Date Taking? Authorizing Provider   fluticasone (VERAMYST) 27.5 MCG/SPRAY nasal spray 2 sprays into each nostril Daily As Needed for Rhinitis.   Yes Enio Roque MD   hydroCHLOROthiazide (HYDRODIURIL) 25 MG tablet Take 25 mg by mouth Daily. 11/15/19  Yes Enio Roque MD   lisinopril (PRINIVIL,ZESTRIL) 10 MG tablet Take 10 mg by mouth Daily. 12n 3/29/17  Yes Enio Roque MD   medroxyPROGESTERone (DEPO-PROVERA) 150 MG/ML injection Inject 1 mL into the appropriate muscle as directed by prescriber Every 3 (Three) Months. 11/27/19  Yes Verenice Rodriguez APRN   metoprolol tartrate (LOPRESSOR) 25 MG tablet Take 25 mg by mouth 2 (Two) Times a Day. 3/14/17  Yes Enio Roque MD   rOPINIRole (REQUIP) 0.5 MG tablet Take 0.5 mg by mouth Every Night. Take 1 hour before bedtime.   Yes Enio Roque MD   vitamin D (ERGOCALCIFEROL) 1.25 MG (23853 UT) capsule capsule Take 50,000 Units by mouth 1 (One) Time Per Week. 10/29/19  Yes Enio Roque MD        The patient has a family history of   Family History   Problem Relation Age of Onset   • Diabetes Mother    • Hypertension Mother    • Allergies Mother    • Cholelithiasis Mother    • Allergies Father         Past Medical History:   Diagnosis Date   • Anxiety    • Gout    • History of MRSA infection    • Hypertension    • Menstrual cycle problem     last period    • Migraine    • Sleep apnea    • Wears glasses         OB History    None          Social History     Socioeconomic History   • Marital status:      Spouse name: Not on file   • Number of children: Not on file   • Years of education: Not on file   • Highest  "education level: Not on file   Tobacco Use   • Smoking status: Current Every Day Smoker     Packs/day: 1.00     Years: 19.00     Pack years: 19.00     Types: Cigarettes   • Smokeless tobacco: Never Used   Substance and Sexual Activity   • Alcohol use: No   • Drug use: No   • Sexual activity: Defer        Past Surgical History:   Procedure Laterality Date   • APPENDECTOMY     • CARPAL TUNNEL RELEASE Right 2017    Procedure: RELEASE OF RIGHT CARPAL TUNNEL;  Surgeon: Thompson Rose MD;  Location: [facility];  Service:    •  SECTION      3   • CHOLECYSTECTOMY     • LAPAROSCOPIC TUBAL LIGATION          Patient Active Problem List   Diagnosis   • Bilateral carpal tunnel syndrome   • Bilateral hand numbness   • Entrapment of right ulnar nerve   • Entrapment of left ulnar nerve   • Bilateral carpal tunnel syndrome   • S/P carpal tunnel release        Documented Vitals    19 1329   BP: 115/82   Weight: 114 kg (252 lb 6.4 oz)   Height: 165.1 cm (65\")        Body mass index is 42 kg/m².    Physical Exam   Constitutional: She is oriented to person, place, and time. She appears well-developed and well-nourished. No distress.   HENT:   Head: Normocephalic.   Musculoskeletal: Normal range of motion.   Neurological: She is alert and oriented to person, place, and time.   Skin: Skin is warm and dry. She is not diaphoretic.   Psychiatric: She has a normal mood and affect. Her behavior is normal. Judgment and thought content normal.   Vitals reviewed.      Laboratory Data:   No results for input(s): GLUCOSE, BUN, CREATININE, NA, K, CL, CO2, CALCIUM, PROTEINTOT, ALBUMIN, ALT, AST, ALKPHOS, BILITOT, EGFRIFNONA, GLOB, AGRATIO, BCR, ANIONGAP, BILIDIR, INDBILI in the last 45350 hours.  Lab Results - Last 18 Months   Lab Units 19  1534   WBC 10*3/mm3 10.44   RBC 10*6/mm3 3.88   HEMOGLOBIN g/dL 12.1   HEMATOCRIT % 35.5   MCV fL 91.5   MCH pg 31.2   MCHC g/dL 34.1   RDW % 12.2*   RDW-SD fl 40.6   MPV fL 9.5   PLATELETS " 10*3/mm3 386     Lab Results - Last 18 Months   Lab Units 11/22/19  1534   HCG QUALITATIVE  Negative       Assessment   Patient with abnormal uterine bleeding and pelvic pain likely secondary to fibroid uterus with 1.1 cm fibroid, now desiring definitive management of her symptoms.  Patient had undergone endometrial biopsy attempt at previous visit with another provider however no endometrial tissue was collected I discussed with patient that there is a possibility of abnormal endometrial tissue without sampling and that endometrial sampling would be indicated prior to hysterectomy to rule out endometrial cancer, patient does not want to do this at this time she would rather proceed with surgery she is aware that this may require additional surgery if cancer is found in the endometrium.  Patient does have an extensive surgical history so will recommend that she undergo total abdominal hysterectomy.  Patient has tried hormonal therapy to include OCPs which did not help and Depo-Provera which did improve bleeding some but she does not like how she feels her the side effects.  Patient now desiring definitive management.  Patient to undergo total abdominal hysterectomy with right salpingo-oophorectomy and left salpingectomy on 20 January. She is to return in 2 to 3 weeks for preop appointment.   Diagnosis Plan   1. Abnormal uterine bleeding (AUB)     2. Pelvic pain     3. Intramural leiomyoma of uterus                 This document has been electronically signed by Severo Aquino DO on December 21, 2019 1:00 PM

## 2019-12-23 PROBLEM — D25.1 INTRAMURAL LEIOMYOMA OF UTERUS: Status: ACTIVE | Noted: 2019-12-23

## 2019-12-23 PROBLEM — N93.9 ABNORMAL UTERINE BLEEDING (AUB): Status: ACTIVE | Noted: 2019-12-23

## 2019-12-23 PROBLEM — R10.2 PELVIC PAIN: Status: ACTIVE | Noted: 2019-12-23

## 2020-01-07 ENCOUNTER — OFFICE VISIT (OUTPATIENT)
Dept: OBSTETRICS AND GYNECOLOGY | Facility: CLINIC | Age: 40
End: 2020-01-07

## 2020-01-07 VITALS
SYSTOLIC BLOOD PRESSURE: 124 MMHG | DIASTOLIC BLOOD PRESSURE: 76 MMHG | WEIGHT: 253 LBS | HEIGHT: 65 IN | BODY MASS INDEX: 42.15 KG/M2

## 2020-01-07 DIAGNOSIS — D25.1 INTRAMURAL LEIOMYOMA OF UTERUS: Primary | ICD-10-CM

## 2020-01-07 DIAGNOSIS — R10.2 PELVIC PAIN: ICD-10-CM

## 2020-01-07 DIAGNOSIS — N93.9 ABNORMAL UTERINE BLEEDING (AUB): ICD-10-CM

## 2020-01-07 PROCEDURE — 99213 OFFICE O/P EST LOW 20 MIN: CPT | Performed by: OBSTETRICS & GYNECOLOGY

## 2020-01-07 NOTE — PROGRESS NOTES
Erica Kan is a 39 y.o. y/o female.     Chief Complaint: Preop appointment for hysterectomy    HPI:   39 y.o. No obstetric history on file..  No LMP recorded. (Menstrual status: Tubal ligation)..  Patient presents for preop prior to total abdominal hysterectomy on 20 January.  Patient with abnormal uterine bleeding not relieved with medical management, and also fibroid uterus.  Patient was noted to have a 1.1 cm fibroid in the uterus on recent ultrasound.  Patient does have most of her pain on the right side as well, plan to do right salpingo-oophorectomy at the time of surgery also will do a left salpingectomy.  Discussed with patient that endometrial biopsy was not able to be obtained at last visit, patient is aware that there is a small risk of endometrial hyperplasia or even endometrial cancer and this may not be found until hysterectomy, this may involve further surgery if endometrial cancer is found.  Patient understands this risk and does not want to undergo additional procedure for endometrial biopsy.    I reviewed the risks, benefits, and alternatives of total abdominal hysterectomy with right salpingo-oophorectomy and left salpingectomy today including the small but real risk of mortality. I reviewed the risk of vesicovaginal fistula with incontinence until it can be repaired. I reviewed the risk of damage to ureters with need for additional surgery and possibly nephrectomy. I reviewed the risk of damage to the bowel with possible colostomy and/or sepsis and/or late return to OR.  I discussed the risk of bleeding with the patient and possible risk of blood transfusion and/or late return to OR .  Blood products carry risk of HIV, infection, hepatitis, and transfusion reaction. Patient is willing to accept blood products. She understands the risk of infection in the abdominal wall, pelvis, abdomen and other parts of the body. I reviewed the risk of blood clots in the leg with possible emboli to the lungs.  I reviewed how her medical and surgical history affect her risk. I reviewed alternate methods of hysterectomy and why I am recommending abdominal approach and offered referral if desired. I reviewed alternate methods of treatment for her condition. Questions answered at length. Patient expressed understanding and desires to proceed with surgery.     Note from first visit: Patient presents for evaluation for possible hysterectomy secondary to heavy bleeding, pelvic pain, and a fibroid uterus.  Patient has tried oral contraceptive pills in the past without relief.  Is currently on Depo-Provera with some relief but not complete relief and does not like the side effects.  Patient has had a cholecystectomy in the past appendectomy and 3 previous C-sections.  Given the surgical history I recommended that patient undergo total abdominal hysterectomy.  Plan for total abdominal hysterectomy with right salpingo-oophorectomy and left salpingectomy on 20 January.  Patient to return in approximately 2 to 3 weeks for preop again patient aware that there is some risk as endometrial sampling was not able to be obtained, she understands these risks and desires to proceed with surgery.     Review of Systems   Constitutional: Negative for chills, fatigue and fever.   HENT: Negative for sore throat.    Eyes: Negative for visual disturbance.   Respiratory: Negative for cough, shortness of breath and wheezing.    Cardiovascular: Negative for chest pain, palpitations and leg swelling.   Gastrointestinal: Negative for abdominal pain, diarrhea, nausea and vomiting.   Genitourinary: Positive for menstrual problem, pelvic pain and vaginal bleeding. Negative for dysuria, flank pain, frequency, vaginal discharge and vaginal pain.   Neurological: Negative for syncope, light-headedness and headaches.   Psychiatric/Behavioral: Negative for dysphoric mood and suicidal ideas. The patient is not nervous/anxious.         The following portions of the  patient's history were reviewed and updated as appropriate: allergies, current medications, past family history, past medical history, past social history, past surgical history and problem list.    Allergies   Allergen Reactions   • Other Other (See Comments)     MRSA hx   • Benadryl [Diphenhydramine Hcl (Sleep)] Rash     Looked like sunburn pt relates she was reallyhot        Prior to Admission medications    Medication Sig Start Date End Date Taking? Authorizing Provider   fluticasone (VERAMYST) 27.5 MCG/SPRAY nasal spray 2 sprays into each nostril Daily As Needed for Rhinitis.   Yes Enio Roque MD   hydroCHLOROthiazide (HYDRODIURIL) 25 MG tablet Take 25 mg by mouth Daily. 11/15/19  Yes Enio Roque MD   lisinopril (PRINIVIL,ZESTRIL) 10 MG tablet Take 10 mg by mouth Daily. 12n 3/29/17  Yes Enio Roque MD   metoprolol tartrate (LOPRESSOR) 25 MG tablet Take 25 mg by mouth 2 (Two) Times a Day. 3/14/17  Yes Enio Roque MD   rOPINIRole (REQUIP) 0.5 MG tablet Take 0.5 mg by mouth Every Night. Take 1 hour before bedtime.   Yes Enio Roque MD   vitamin D (ERGOCALCIFEROL) 1.25 MG (39803 UT) capsule capsule Take 50,000 Units by mouth 1 (One) Time Per Week. 10/29/19  Yes Enio Roque MD   medroxyPROGESTERone (DEPO-PROVERA) 150 MG/ML injection Inject 1 mL into the appropriate muscle as directed by prescriber Every 3 (Three) Months. 11/27/19 1/7/20 Yes Verenice Rodriguez APRN        The patient has a family history of   Family History   Problem Relation Age of Onset   • Diabetes Mother    • Hypertension Mother    • Allergies Mother    • Cholelithiasis Mother    • Allergies Father         Past Medical History:   Diagnosis Date   • Anxiety    • Gout    • History of MRSA infection    • Hypertension    • Menstrual cycle problem     last period    • Migraine    • Sleep apnea    • Wears glasses         OB History    None          Social History     Socioeconomic  "History   • Marital status:      Spouse name: Not on file   • Number of children: Not on file   • Years of education: Not on file   • Highest education level: Not on file   Tobacco Use   • Smoking status: Current Every Day Smoker     Packs/day: 1.00     Years: 19.00     Pack years: 19.00     Types: Cigarettes   • Smokeless tobacco: Never Used   Substance and Sexual Activity   • Alcohol use: No   • Drug use: No   • Sexual activity: Defer        Past Surgical History:   Procedure Laterality Date   • APPENDECTOMY     • CARPAL TUNNEL RELEASE Right 2017    Procedure: RELEASE OF RIGHT CARPAL TUNNEL;  Surgeon: Thompson Rose MD;  Location: [facility];  Service:    •  SECTION      3   • CHOLECYSTECTOMY     • LAPAROSCOPIC TUBAL LIGATION          Patient Active Problem List   Diagnosis   • Bilateral carpal tunnel syndrome   • Bilateral hand numbness   • Entrapment of right ulnar nerve   • Entrapment of left ulnar nerve   • Bilateral carpal tunnel syndrome   • S/P carpal tunnel release   • Abnormal uterine bleeding (AUB)   • Pelvic pain   • Intramural leiomyoma of uterus        Documented Vitals    20 0952   BP: 124/76   Weight: 115 kg (253 lb)   Height: 165.1 cm (65\")        Body mass index is 42.1 kg/m².    Physical Exam   Constitutional: She is oriented to person, place, and time. She appears well-developed and well-nourished. No distress.   HENT:   Head: Normocephalic.   Neck: No thyromegaly present.   Cardiovascular: Normal rate, regular rhythm, normal heart sounds and intact distal pulses.   No murmur heard.  Pulmonary/Chest: Effort normal and breath sounds normal. No respiratory distress. She has no wheezes.   Abdominal: Soft. Bowel sounds are normal. She exhibits no distension and no mass. There is no tenderness. There is no rebound and no guarding.   Musculoskeletal: Normal range of motion. She exhibits no edema, tenderness or deformity.   Neurological: She is alert and oriented to person, " place, and time.   Skin: Skin is warm and dry. No erythema.   Psychiatric: She has a normal mood and affect. Her behavior is normal. Judgment and thought content normal.   Vitals reviewed.      Laboratory Data:   No results for input(s): GLUCOSE, BUN, CREATININE, NA, K, CL, CO2, CALCIUM, PROTEINTOT, ALBUMIN, ALT, AST, ALKPHOS, BILITOT, EGFRIFNONA, GLOB, AGRATIO, BCR, ANIONGAP, BILIDIR, INDBILI in the last 16265 hours.  Lab Results - Last 18 Months   Lab Units 11/22/19  1534   WBC 10*3/mm3 10.44   RBC 10*6/mm3 3.88   HEMOGLOBIN g/dL 12.1   HEMATOCRIT % 35.5   MCV fL 91.5   MCH pg 31.2   MCHC g/dL 34.1   RDW % 12.2*   RDW-SD fl 40.6   MPV fL 9.5   PLATELETS 10*3/mm3 386     Lab Results - Last 18 Months   Lab Units 11/22/19  1534   HCG QUALITATIVE  Negative       Assessment   Patient with uterine fibroid, pelvic pain and abnormal uterine bleeding.  Plan for patient undergo total abdominal hysterectomy with right salpingo-oophorectomy and left salpingectomy on 20 January, patient to return to see me in 3 weeks for 1 week postop visit.  Return sooner as needed.     Diagnosis Plan   1. Intramural leiomyoma of uterus     2. Pelvic pain     3. Abnormal uterine bleeding (AUB)             This document has been electronically signed by Severo Aquino DO on January 7, 2020 10:08 AM

## 2020-01-07 NOTE — H&P (VIEW-ONLY)
Erica Kan is a 39 y.o. y/o female.     Chief Complaint: Preop appointment for hysterectomy    HPI:   39 y.o. No obstetric history on file..  No LMP recorded. (Menstrual status: Tubal ligation)..  Patient presents for preop prior to total abdominal hysterectomy on 20 January.  Patient with abnormal uterine bleeding not relieved with medical management, and also fibroid uterus.  Patient was noted to have a 1.1 cm fibroid in the uterus on recent ultrasound.  Patient does have most of her pain on the right side as well, plan to do right salpingo-oophorectomy at the time of surgery also will do a left salpingectomy.  Discussed with patient that endometrial biopsy was not able to be obtained at last visit, patient is aware that there is a small risk of endometrial hyperplasia or even endometrial cancer and this may not be found until hysterectomy, this may involve further surgery if endometrial cancer is found.  Patient understands this risk and does not want to undergo additional procedure for endometrial biopsy.    I reviewed the risks, benefits, and alternatives of total abdominal hysterectomy with right salpingo-oophorectomy and left salpingectomy today including the small but real risk of mortality. I reviewed the risk of vesicovaginal fistula with incontinence until it can be repaired. I reviewed the risk of damage to ureters with need for additional surgery and possibly nephrectomy. I reviewed the risk of damage to the bowel with possible colostomy and/or sepsis and/or late return to OR.  I discussed the risk of bleeding with the patient and possible risk of blood transfusion and/or late return to OR .  Blood products carry risk of HIV, infection, hepatitis, and transfusion reaction. Patient is willing to accept blood products. She understands the risk of infection in the abdominal wall, pelvis, abdomen and other parts of the body. I reviewed the risk of blood clots in the leg with possible emboli to the lungs.  I reviewed how her medical and surgical history affect her risk. I reviewed alternate methods of hysterectomy and why I am recommending abdominal approach and offered referral if desired. I reviewed alternate methods of treatment for her condition. Questions answered at length. Patient expressed understanding and desires to proceed with surgery.     Note from first visit: Patient presents for evaluation for possible hysterectomy secondary to heavy bleeding, pelvic pain, and a fibroid uterus.  Patient has tried oral contraceptive pills in the past without relief.  Is currently on Depo-Provera with some relief but not complete relief and does not like the side effects.  Patient has had a cholecystectomy in the past appendectomy and 3 previous C-sections.  Given the surgical history I recommended that patient undergo total abdominal hysterectomy.  Plan for total abdominal hysterectomy with right salpingo-oophorectomy and left salpingectomy on 20 January.  Patient to return in approximately 2 to 3 weeks for preop again patient aware that there is some risk as endometrial sampling was not able to be obtained, she understands these risks and desires to proceed with surgery.     Review of Systems   Constitutional: Negative for chills, fatigue and fever.   HENT: Negative for sore throat.    Eyes: Negative for visual disturbance.   Respiratory: Negative for cough, shortness of breath and wheezing.    Cardiovascular: Negative for chest pain, palpitations and leg swelling.   Gastrointestinal: Negative for abdominal pain, diarrhea, nausea and vomiting.   Genitourinary: Positive for menstrual problem, pelvic pain and vaginal bleeding. Negative for dysuria, flank pain, frequency, vaginal discharge and vaginal pain.   Neurological: Negative for syncope, light-headedness and headaches.   Psychiatric/Behavioral: Negative for dysphoric mood and suicidal ideas. The patient is not nervous/anxious.         The following portions of the  patient's history were reviewed and updated as appropriate: allergies, current medications, past family history, past medical history, past social history, past surgical history and problem list.    Allergies   Allergen Reactions   • Other Other (See Comments)     MRSA hx   • Benadryl [Diphenhydramine Hcl (Sleep)] Rash     Looked like sunburn pt relates she was reallyhot        Prior to Admission medications    Medication Sig Start Date End Date Taking? Authorizing Provider   fluticasone (VERAMYST) 27.5 MCG/SPRAY nasal spray 2 sprays into each nostril Daily As Needed for Rhinitis.   Yes Enio Roque MD   hydroCHLOROthiazide (HYDRODIURIL) 25 MG tablet Take 25 mg by mouth Daily. 11/15/19  Yes Enio Roque MD   lisinopril (PRINIVIL,ZESTRIL) 10 MG tablet Take 10 mg by mouth Daily. 12n 3/29/17  Yes Enio Roque MD   metoprolol tartrate (LOPRESSOR) 25 MG tablet Take 25 mg by mouth 2 (Two) Times a Day. 3/14/17  Yes Enio Roque MD   rOPINIRole (REQUIP) 0.5 MG tablet Take 0.5 mg by mouth Every Night. Take 1 hour before bedtime.   Yes Enio Roque MD   vitamin D (ERGOCALCIFEROL) 1.25 MG (75293 UT) capsule capsule Take 50,000 Units by mouth 1 (One) Time Per Week. 10/29/19  Yes Enio Roque MD   medroxyPROGESTERone (DEPO-PROVERA) 150 MG/ML injection Inject 1 mL into the appropriate muscle as directed by prescriber Every 3 (Three) Months. 11/27/19 1/7/20 Yes Verenice Rodriguez APRN        The patient has a family history of   Family History   Problem Relation Age of Onset   • Diabetes Mother    • Hypertension Mother    • Allergies Mother    • Cholelithiasis Mother    • Allergies Father         Past Medical History:   Diagnosis Date   • Anxiety    • Gout    • History of MRSA infection    • Hypertension    • Menstrual cycle problem     last period    • Migraine    • Sleep apnea    • Wears glasses         OB History    None          Social History     Socioeconomic  "History   • Marital status:      Spouse name: Not on file   • Number of children: Not on file   • Years of education: Not on file   • Highest education level: Not on file   Tobacco Use   • Smoking status: Current Every Day Smoker     Packs/day: 1.00     Years: 19.00     Pack years: 19.00     Types: Cigarettes   • Smokeless tobacco: Never Used   Substance and Sexual Activity   • Alcohol use: No   • Drug use: No   • Sexual activity: Defer        Past Surgical History:   Procedure Laterality Date   • APPENDECTOMY     • CARPAL TUNNEL RELEASE Right 2017    Procedure: RELEASE OF RIGHT CARPAL TUNNEL;  Surgeon: Thompson Rose MD;  Location: [facility];  Service:    •  SECTION      3   • CHOLECYSTECTOMY     • LAPAROSCOPIC TUBAL LIGATION          Patient Active Problem List   Diagnosis   • Bilateral carpal tunnel syndrome   • Bilateral hand numbness   • Entrapment of right ulnar nerve   • Entrapment of left ulnar nerve   • Bilateral carpal tunnel syndrome   • S/P carpal tunnel release   • Abnormal uterine bleeding (AUB)   • Pelvic pain   • Intramural leiomyoma of uterus        Documented Vitals    20 0952   BP: 124/76   Weight: 115 kg (253 lb)   Height: 165.1 cm (65\")        Body mass index is 42.1 kg/m².    Physical Exam   Constitutional: She is oriented to person, place, and time. She appears well-developed and well-nourished. No distress.   HENT:   Head: Normocephalic.   Neck: No thyromegaly present.   Cardiovascular: Normal rate, regular rhythm, normal heart sounds and intact distal pulses.   No murmur heard.  Pulmonary/Chest: Effort normal and breath sounds normal. No respiratory distress. She has no wheezes.   Abdominal: Soft. Bowel sounds are normal. She exhibits no distension and no mass. There is no tenderness. There is no rebound and no guarding.   Musculoskeletal: Normal range of motion. She exhibits no edema, tenderness or deformity.   Neurological: She is alert and oriented to person, " place, and time.   Skin: Skin is warm and dry. No erythema.   Psychiatric: She has a normal mood and affect. Her behavior is normal. Judgment and thought content normal.   Vitals reviewed.      Laboratory Data:   No results for input(s): GLUCOSE, BUN, CREATININE, NA, K, CL, CO2, CALCIUM, PROTEINTOT, ALBUMIN, ALT, AST, ALKPHOS, BILITOT, EGFRIFNONA, GLOB, AGRATIO, BCR, ANIONGAP, BILIDIR, INDBILI in the last 43816 hours.  Lab Results - Last 18 Months   Lab Units 11/22/19  1534   WBC 10*3/mm3 10.44   RBC 10*6/mm3 3.88   HEMOGLOBIN g/dL 12.1   HEMATOCRIT % 35.5   MCV fL 91.5   MCH pg 31.2   MCHC g/dL 34.1   RDW % 12.2*   RDW-SD fl 40.6   MPV fL 9.5   PLATELETS 10*3/mm3 386     Lab Results - Last 18 Months   Lab Units 11/22/19  1534   HCG QUALITATIVE  Negative       Assessment   Patient with uterine fibroid, pelvic pain and abnormal uterine bleeding.  Plan for patient undergo total abdominal hysterectomy with right salpingo-oophorectomy and left salpingectomy on 20 January, patient to return to see me in 3 weeks for 1 week postop visit.  Return sooner as needed.     Diagnosis Plan   1. Intramural leiomyoma of uterus     2. Pelvic pain     3. Abnormal uterine bleeding (AUB)             This document has been electronically signed by Severo Aquino DO on January 7, 2020 10:08 AM

## 2020-01-15 ENCOUNTER — APPOINTMENT (OUTPATIENT)
Dept: PREADMISSION TESTING | Facility: HOSPITAL | Age: 40
End: 2020-01-15

## 2020-01-15 VITALS
DIASTOLIC BLOOD PRESSURE: 70 MMHG | OXYGEN SATURATION: 100 % | BODY MASS INDEX: 42.82 KG/M2 | WEIGHT: 257 LBS | HEIGHT: 65 IN | SYSTOLIC BLOOD PRESSURE: 130 MMHG | HEART RATE: 63 BPM | RESPIRATION RATE: 18 BRPM

## 2020-01-15 DIAGNOSIS — N93.9 ABNORMAL UTERINE BLEEDING (AUB): ICD-10-CM

## 2020-01-15 DIAGNOSIS — R10.2 PELVIC PAIN: ICD-10-CM

## 2020-01-15 DIAGNOSIS — D25.1 INTRAMURAL LEIOMYOMA OF UTERUS: ICD-10-CM

## 2020-01-15 LAB
ABO GROUP BLD: NORMAL
ANION GAP SERPL CALCULATED.3IONS-SCNC: 11 MMOL/L (ref 5–15)
BASOPHILS # BLD AUTO: 0.05 10*3/MM3 (ref 0–0.2)
BASOPHILS NFR BLD AUTO: 0.5 % (ref 0–1.5)
BLD GP AB SCN SERPL QL: NEGATIVE
BUN BLD-MCNC: 13 MG/DL (ref 6–20)
BUN/CREAT SERPL: 18.6 (ref 7–25)
CALCIUM SPEC-SCNC: 9.5 MG/DL (ref 8.6–10.5)
CHLORIDE SERPL-SCNC: 102 MMOL/L (ref 98–107)
CO2 SERPL-SCNC: 25 MMOL/L (ref 22–29)
CREAT BLD-MCNC: 0.7 MG/DL (ref 0.57–1)
DEPRECATED RDW RBC AUTO: 40.7 FL (ref 37–54)
EOSINOPHIL # BLD AUTO: 0.1 10*3/MM3 (ref 0–0.4)
EOSINOPHIL NFR BLD AUTO: 0.9 % (ref 0.3–6.2)
ERYTHROCYTE [DISTWIDTH] IN BLOOD BY AUTOMATED COUNT: 12.9 % (ref 12.3–15.4)
GFR SERPL CREATININE-BSD FRML MDRD: 93 ML/MIN/1.73
GLUCOSE BLD-MCNC: 92 MG/DL (ref 65–99)
HCT VFR BLD AUTO: 36.8 % (ref 34–46.6)
HGB BLD-MCNC: 12.3 G/DL (ref 12–15.9)
IMM GRANULOCYTES # BLD AUTO: 0.06 10*3/MM3 (ref 0–0.05)
IMM GRANULOCYTES NFR BLD AUTO: 0.6 % (ref 0–0.5)
LYMPHOCYTES # BLD AUTO: 3.04 10*3/MM3 (ref 0.7–3.1)
LYMPHOCYTES NFR BLD AUTO: 28.6 % (ref 19.6–45.3)
Lab: NORMAL
MCH RBC QN AUTO: 29.1 PG (ref 26.6–33)
MCHC RBC AUTO-ENTMCNC: 33.4 G/DL (ref 31.5–35.7)
MCV RBC AUTO: 87 FL (ref 79–97)
MONOCYTES # BLD AUTO: 0.52 10*3/MM3 (ref 0.1–0.9)
MONOCYTES NFR BLD AUTO: 4.9 % (ref 5–12)
NEUTROPHILS # BLD AUTO: 6.87 10*3/MM3 (ref 1.7–7)
NEUTROPHILS NFR BLD AUTO: 64.5 % (ref 42.7–76)
NRBC BLD AUTO-RTO: 0 /100 WBC (ref 0–0.2)
PLATELET # BLD AUTO: 398 10*3/MM3 (ref 140–450)
PMV BLD AUTO: 9.4 FL (ref 6–12)
POTASSIUM BLD-SCNC: 4.1 MMOL/L (ref 3.5–5.2)
RBC # BLD AUTO: 4.23 10*6/MM3 (ref 3.77–5.28)
RH BLD: POSITIVE
SODIUM BLD-SCNC: 138 MMOL/L (ref 136–145)
T&S EXPIRATION DATE: NORMAL
WBC NRBC COR # BLD: 10.64 10*3/MM3 (ref 3.4–10.8)

## 2020-01-15 PROCEDURE — 93005 ELECTROCARDIOGRAM TRACING: CPT

## 2020-01-15 PROCEDURE — 36415 COLL VENOUS BLD VENIPUNCTURE: CPT

## 2020-01-15 PROCEDURE — 86901 BLOOD TYPING SEROLOGIC RH(D): CPT | Performed by: ANESTHESIOLOGY

## 2020-01-15 PROCEDURE — 93010 ELECTROCARDIOGRAM REPORT: CPT | Performed by: INTERNAL MEDICINE

## 2020-01-15 PROCEDURE — 86850 RBC ANTIBODY SCREEN: CPT | Performed by: ANESTHESIOLOGY

## 2020-01-15 PROCEDURE — 80048 BASIC METABOLIC PNL TOTAL CA: CPT | Performed by: OBSTETRICS & GYNECOLOGY

## 2020-01-15 PROCEDURE — 85025 COMPLETE CBC W/AUTO DIFF WBC: CPT | Performed by: OBSTETRICS & GYNECOLOGY

## 2020-01-15 PROCEDURE — 86900 BLOOD TYPING SEROLOGIC ABO: CPT | Performed by: ANESTHESIOLOGY

## 2020-01-15 NOTE — DISCHARGE INSTRUCTIONS
[facility]  Pre-op Information and Guidelines    You will be called after 2 p.m. the day before your surgery (Friday for Monday surgery) and notified of your time for arrival and approximate surgery time.  If you have not received a call by 4P.M., please contact Same Day Surgery at [phone] of if outside [facility] call [phone].    Please Follow these Important Safety Guidelines:    • The morning of your procedure, take only the medications listed below with   A sip of water:_____________________________________________       ______METOPROLOL__________________________    • DO NOT eat or drink anything after 12:00 midnight the night before surgery  Specific instructions concerning drinking clear liquids will be discussed during  the pre-surgery instruction call the day before your surgery.    • If you take a blood thinner (ex. Plavix, Coumadin, aspirin), ask your doctor when to stop it before surgery  STOP DATE: _________________    • Only 2 visitors are allowed in patient rooms at a time  Your visitors will be asked to wait in the lobby until the admission process is complete with the exception of a parent with a child and patients in need of special assistance.    • YOU CANNOT DRIVE YOURSELF HOME  You must be accompanied by someone who will be responsible for driving you home after surgery and for your care at home.    • DO NOT chew gum, use breath mints, hard candy, or smoke the day of surgery  • DO NOT drink alcohol for at least 24 hours before your surgery  • DO NOT wear any jewelry and remove all body piercing before coming to the hospital  • DO NOT wear make-up to the hospital  • If you are having surgery on an extremity (arm/leg/foot) remove nail polish/artificial nails on the surgical side  • Clothing, glasses, contacts, dentures, and hairpieces must be removed before surgery  • Bathe the night before or the morning of your surgery and do not use powders/lotions on  skin.

## 2020-01-18 ENCOUNTER — ANESTHESIA EVENT (OUTPATIENT)
Dept: PERIOP | Facility: HOSPITAL | Age: 40
End: 2020-01-18

## 2020-01-20 ENCOUNTER — HOSPITAL ENCOUNTER (INPATIENT)
Facility: HOSPITAL | Age: 40
LOS: 1 days | Discharge: HOME | End: 2020-01-21
Attending: OBSTETRICS & GYNECOLOGY | Admitting: OBSTETRICS & GYNECOLOGY

## 2020-01-20 ENCOUNTER — ANESTHESIA (OUTPATIENT)
Dept: PERIOP | Facility: HOSPITAL | Age: 40
End: 2020-01-20

## 2020-01-20 DIAGNOSIS — R10.2 PELVIC PAIN: ICD-10-CM

## 2020-01-20 DIAGNOSIS — N93.9 ABNORMAL UTERINE BLEEDING (AUB): ICD-10-CM

## 2020-01-20 DIAGNOSIS — D25.1 INTRAMURAL LEIOMYOMA OF UTERUS: ICD-10-CM

## 2020-01-20 LAB
ABO GROUP BLD: NORMAL
BLD GP AB SCN SERPL QL: NEGATIVE
HCG SERPL QL: NEGATIVE
Lab: NORMAL
RH BLD: POSITIVE
T&S EXPIRATION DATE: NORMAL

## 2020-01-20 PROCEDURE — 88307 TISSUE EXAM BY PATHOLOGIST: CPT | Performed by: PATHOLOGY

## 2020-01-20 PROCEDURE — 84703 CHORIONIC GONADOTROPIN ASSAY: CPT | Performed by: ANESTHESIOLOGY

## 2020-01-20 PROCEDURE — 86900 BLOOD TYPING SEROLOGIC ABO: CPT | Performed by: OBSTETRICS & GYNECOLOGY

## 2020-01-20 PROCEDURE — 25010000002 FENTANYL CITRATE (PF) 100 MCG/2ML SOLUTION: Performed by: NURSE ANESTHETIST, CERTIFIED REGISTERED

## 2020-01-20 PROCEDURE — 58150 TOTAL HYSTERECTOMY: CPT | Performed by: OBSTETRICS & GYNECOLOGY

## 2020-01-20 PROCEDURE — 86850 RBC ANTIBODY SCREEN: CPT | Performed by: OBSTETRICS & GYNECOLOGY

## 2020-01-20 PROCEDURE — 0UT90ZZ RESECTION OF UTERUS, OPEN APPROACH: ICD-10-PCS | Performed by: OBSTETRICS & GYNECOLOGY

## 2020-01-20 PROCEDURE — 25010000002 NEOSTIGMINE 4 MG/4ML SOLUTION PREFILLED SYRINGE: Performed by: NURSE ANESTHETIST, CERTIFIED REGISTERED

## 2020-01-20 PROCEDURE — 25010000002 ONDANSETRON PER 1 MG: Performed by: NURSE ANESTHETIST, CERTIFIED REGISTERED

## 2020-01-20 PROCEDURE — 25010000002 KETOROLAC TROMETHAMINE PER 15 MG: Performed by: NURSE ANESTHETIST, CERTIFIED REGISTERED

## 2020-01-20 PROCEDURE — 25010000002 DEXAMETHASONE PER 1 MG: Performed by: NURSE ANESTHETIST, CERTIFIED REGISTERED

## 2020-01-20 PROCEDURE — 25010000002 SUCCINYLCHOLINE PER 20 MG: Performed by: NURSE ANESTHETIST, CERTIFIED REGISTERED

## 2020-01-20 PROCEDURE — 25010000002 MIDAZOLAM PER 1 MG: Performed by: NURSE ANESTHETIST, CERTIFIED REGISTERED

## 2020-01-20 PROCEDURE — 86901 BLOOD TYPING SEROLOGIC RH(D): CPT | Performed by: OBSTETRICS & GYNECOLOGY

## 2020-01-20 PROCEDURE — 25010000002 HYDROMORPHONE PER 4 MG: Performed by: OBSTETRICS & GYNECOLOGY

## 2020-01-20 PROCEDURE — 88307 TISSUE EXAM BY PATHOLOGIST: CPT | Performed by: OBSTETRICS & GYNECOLOGY

## 2020-01-20 PROCEDURE — 0UT70ZZ RESECTION OF BILATERAL FALLOPIAN TUBES, OPEN APPROACH: ICD-10-PCS | Performed by: OBSTETRICS & GYNECOLOGY

## 2020-01-20 PROCEDURE — 0UT00ZZ RESECTION OF RIGHT OVARY, OPEN APPROACH: ICD-10-PCS | Performed by: OBSTETRICS & GYNECOLOGY

## 2020-01-20 PROCEDURE — 25010000002 PROPOFOL 10 MG/ML EMULSION: Performed by: NURSE ANESTHETIST, CERTIFIED REGISTERED

## 2020-01-20 RX ORDER — LABETALOL HYDROCHLORIDE 5 MG/ML
5 INJECTION, SOLUTION INTRAVENOUS
Status: DISCONTINUED | OUTPATIENT
Start: 2020-01-20 | End: 2020-01-20 | Stop reason: HOSPADM

## 2020-01-20 RX ORDER — PROMETHAZINE HYDROCHLORIDE 25 MG/ML
12.5 INJECTION, SOLUTION INTRAMUSCULAR; INTRAVENOUS ONCE AS NEEDED
Status: DISCONTINUED | OUTPATIENT
Start: 2020-01-20 | End: 2020-01-20 | Stop reason: HOSPADM

## 2020-01-20 RX ORDER — LIDOCAINE HYDROCHLORIDE 20 MG/ML
INJECTION, SOLUTION INFILTRATION; PERINEURAL AS NEEDED
Status: DISCONTINUED | OUTPATIENT
Start: 2020-01-20 | End: 2020-01-20 | Stop reason: SURG

## 2020-01-20 RX ORDER — SODIUM CHLORIDE, SODIUM LACTATE, POTASSIUM CHLORIDE, CALCIUM CHLORIDE 600; 310; 30; 20 MG/100ML; MG/100ML; MG/100ML; MG/100ML
125 INJECTION, SOLUTION INTRAVENOUS CONTINUOUS
Status: DISCONTINUED | OUTPATIENT
Start: 2020-01-20 | End: 2020-01-21 | Stop reason: HOSPADM

## 2020-01-20 RX ORDER — BUPIVACAINE HCL/0.9 % NACL/PF 0.1 %
2 PLASTIC BAG, INJECTION (ML) EPIDURAL ONCE
Status: COMPLETED | OUTPATIENT
Start: 2020-01-20 | End: 2020-01-20

## 2020-01-20 RX ORDER — ONDANSETRON 2 MG/ML
INJECTION INTRAMUSCULAR; INTRAVENOUS AS NEEDED
Status: DISCONTINUED | OUTPATIENT
Start: 2020-01-20 | End: 2020-01-20 | Stop reason: SURG

## 2020-01-20 RX ORDER — ACETAMINOPHEN 325 MG/1
650 TABLET ORAL ONCE AS NEEDED
Status: DISCONTINUED | OUTPATIENT
Start: 2020-01-20 | End: 2020-01-20 | Stop reason: HOSPADM

## 2020-01-20 RX ORDER — HYDROMORPHONE HCL IN 0.9% NACL 0.2 MG/ML
PLASTIC BAG, INJECTION (ML) INTRAVENOUS CONTINUOUS
Status: DISCONTINUED | OUTPATIENT
Start: 2020-01-20 | End: 2020-01-21 | Stop reason: HOSPADM

## 2020-01-20 RX ORDER — DEXAMETHASONE SODIUM PHOSPHATE 4 MG/ML
INJECTION, SOLUTION INTRA-ARTICULAR; INTRALESIONAL; INTRAMUSCULAR; INTRAVENOUS; SOFT TISSUE AS NEEDED
Status: DISCONTINUED | OUTPATIENT
Start: 2020-01-20 | End: 2020-01-20 | Stop reason: SURG

## 2020-01-20 RX ORDER — PROMETHAZINE HYDROCHLORIDE 25 MG/ML
6.25 INJECTION, SOLUTION INTRAMUSCULAR; INTRAVENOUS
Status: DISCONTINUED | OUTPATIENT
Start: 2020-01-20 | End: 2020-01-21 | Stop reason: HOSPADM

## 2020-01-20 RX ORDER — ROCURONIUM BROMIDE 10 MG/ML
INJECTION, SOLUTION INTRAVENOUS AS NEEDED
Status: DISCONTINUED | OUTPATIENT
Start: 2020-01-20 | End: 2020-01-20 | Stop reason: SURG

## 2020-01-20 RX ORDER — PROMETHAZINE HYDROCHLORIDE 25 MG/ML
12.5 INJECTION, SOLUTION INTRAMUSCULAR; INTRAVENOUS EVERY 4 HOURS PRN
Status: DISCONTINUED | OUTPATIENT
Start: 2020-01-20 | End: 2020-01-21 | Stop reason: HOSPADM

## 2020-01-20 RX ORDER — SUCCINYLCHOLINE CHLORIDE 20 MG/ML
INJECTION INTRAMUSCULAR; INTRAVENOUS AS NEEDED
Status: DISCONTINUED | OUTPATIENT
Start: 2020-01-20 | End: 2020-01-20 | Stop reason: SURG

## 2020-01-20 RX ORDER — KETOROLAC TROMETHAMINE 30 MG/ML
INJECTION, SOLUTION INTRAMUSCULAR; INTRAVENOUS AS NEEDED
Status: DISCONTINUED | OUTPATIENT
Start: 2020-01-20 | End: 2020-01-20 | Stop reason: SURG

## 2020-01-20 RX ORDER — NALOXONE HCL 0.4 MG/ML
0.1 VIAL (ML) INJECTION
Status: DISCONTINUED | OUTPATIENT
Start: 2020-01-20 | End: 2020-01-21 | Stop reason: HOSPADM

## 2020-01-20 RX ORDER — PROMETHAZINE HYDROCHLORIDE 25 MG/1
25 TABLET ORAL ONCE AS NEEDED
Status: DISCONTINUED | OUTPATIENT
Start: 2020-01-20 | End: 2020-01-20 | Stop reason: HOSPADM

## 2020-01-20 RX ORDER — EPHEDRINE SULFATE 50 MG/ML
5 INJECTION, SOLUTION INTRAVENOUS ONCE AS NEEDED
Status: DISCONTINUED | OUTPATIENT
Start: 2020-01-20 | End: 2020-01-20 | Stop reason: HOSPADM

## 2020-01-20 RX ORDER — SODIUM CHLORIDE 0.9 % (FLUSH) 0.9 %
3 SYRINGE (ML) INJECTION EVERY 12 HOURS SCHEDULED
Status: DISCONTINUED | OUTPATIENT
Start: 2020-01-20 | End: 2020-01-20 | Stop reason: HOSPADM

## 2020-01-20 RX ORDER — ROPINIROLE 0.5 MG/1
0.5 TABLET, FILM COATED ORAL NIGHTLY
Status: DISCONTINUED | OUTPATIENT
Start: 2020-01-20 | End: 2020-01-21 | Stop reason: HOSPADM

## 2020-01-20 RX ORDER — NALOXONE HCL 0.4 MG/ML
0.4 VIAL (ML) INJECTION AS NEEDED
Status: DISCONTINUED | OUTPATIENT
Start: 2020-01-20 | End: 2020-01-20 | Stop reason: HOSPADM

## 2020-01-20 RX ORDER — PROMETHAZINE HYDROCHLORIDE 12.5 MG/1
12.5 SUPPOSITORY RECTAL EVERY 6 HOURS PRN
Status: DISCONTINUED | OUTPATIENT
Start: 2020-01-20 | End: 2020-01-21 | Stop reason: HOSPADM

## 2020-01-20 RX ORDER — MIDAZOLAM HYDROCHLORIDE 1 MG/ML
INJECTION INTRAMUSCULAR; INTRAVENOUS AS NEEDED
Status: DISCONTINUED | OUTPATIENT
Start: 2020-01-20 | End: 2020-01-20 | Stop reason: SURG

## 2020-01-20 RX ORDER — ACETAMINOPHEN 650 MG/1
650 SUPPOSITORY RECTAL ONCE AS NEEDED
Status: DISCONTINUED | OUTPATIENT
Start: 2020-01-20 | End: 2020-01-20 | Stop reason: HOSPADM

## 2020-01-20 RX ORDER — PROMETHAZINE HYDROCHLORIDE 12.5 MG/1
12.5 TABLET ORAL EVERY 6 HOURS PRN
Status: DISCONTINUED | OUTPATIENT
Start: 2020-01-20 | End: 2020-01-21 | Stop reason: HOSPADM

## 2020-01-20 RX ORDER — SODIUM CHLORIDE 0.9 % (FLUSH) 0.9 %
10 SYRINGE (ML) INJECTION AS NEEDED
Status: DISCONTINUED | OUTPATIENT
Start: 2020-01-20 | End: 2020-01-20 | Stop reason: HOSPADM

## 2020-01-20 RX ORDER — ONDANSETRON 2 MG/ML
4 INJECTION INTRAMUSCULAR; INTRAVENOUS EVERY 6 HOURS PRN
Status: DISCONTINUED | OUTPATIENT
Start: 2020-01-20 | End: 2020-01-21 | Stop reason: HOSPADM

## 2020-01-20 RX ORDER — PROMETHAZINE HYDROCHLORIDE 25 MG/1
25 SUPPOSITORY RECTAL ONCE AS NEEDED
Status: DISCONTINUED | OUTPATIENT
Start: 2020-01-20 | End: 2020-01-20 | Stop reason: HOSPADM

## 2020-01-20 RX ORDER — FLUMAZENIL 0.1 MG/ML
0.2 INJECTION INTRAVENOUS AS NEEDED
Status: DISCONTINUED | OUTPATIENT
Start: 2020-01-20 | End: 2020-01-20 | Stop reason: HOSPADM

## 2020-01-20 RX ORDER — ACETAMINOPHEN 500 MG
1000 TABLET ORAL EVERY 8 HOURS
Status: DISCONTINUED | OUTPATIENT
Start: 2020-01-20 | End: 2020-01-21 | Stop reason: HOSPADM

## 2020-01-20 RX ORDER — PROPOFOL 10 MG/ML
VIAL (ML) INTRAVENOUS AS NEEDED
Status: DISCONTINUED | OUTPATIENT
Start: 2020-01-20 | End: 2020-01-20 | Stop reason: SURG

## 2020-01-20 RX ORDER — FENTANYL CITRATE 50 UG/ML
INJECTION, SOLUTION INTRAMUSCULAR; INTRAVENOUS AS NEEDED
Status: DISCONTINUED | OUTPATIENT
Start: 2020-01-20 | End: 2020-01-20 | Stop reason: SURG

## 2020-01-20 RX ORDER — DIPHENHYDRAMINE HYDROCHLORIDE 50 MG/ML
12.5 INJECTION INTRAMUSCULAR; INTRAVENOUS
Status: DISCONTINUED | OUTPATIENT
Start: 2020-01-20 | End: 2020-01-20 | Stop reason: HOSPADM

## 2020-01-20 RX ORDER — BISACODYL 10 MG
10 SUPPOSITORY, RECTAL RECTAL DAILY PRN
Status: DISCONTINUED | OUTPATIENT
Start: 2020-01-20 | End: 2020-01-21 | Stop reason: HOSPADM

## 2020-01-20 RX ORDER — KETOROLAC TROMETHAMINE 30 MG/ML
30 INJECTION, SOLUTION INTRAMUSCULAR; INTRAVENOUS EVERY 6 HOURS
Status: DISCONTINUED | OUTPATIENT
Start: 2020-01-20 | End: 2020-01-21 | Stop reason: HOSPADM

## 2020-01-20 RX ORDER — ONDANSETRON 2 MG/ML
4 INJECTION INTRAMUSCULAR; INTRAVENOUS ONCE AS NEEDED
Status: DISCONTINUED | OUTPATIENT
Start: 2020-01-20 | End: 2020-01-20 | Stop reason: HOSPADM

## 2020-01-20 RX ORDER — MAGNESIUM HYDROXIDE 1200 MG/15ML
LIQUID ORAL AS NEEDED
Status: DISCONTINUED | OUTPATIENT
Start: 2020-01-20 | End: 2020-01-20 | Stop reason: HOSPADM

## 2020-01-20 RX ORDER — SODIUM CHLORIDE, SODIUM GLUCONATE, SODIUM ACETATE, POTASSIUM CHLORIDE, AND MAGNESIUM CHLORIDE 526; 502; 368; 37; 30 MG/100ML; MG/100ML; MG/100ML; MG/100ML; MG/100ML
INJECTION, SOLUTION INTRAVENOUS CONTINUOUS PRN
Status: DISCONTINUED | OUTPATIENT
Start: 2020-01-20 | End: 2020-01-20 | Stop reason: SURG

## 2020-01-20 RX ORDER — ONDANSETRON 4 MG/1
4 TABLET, FILM COATED ORAL EVERY 6 HOURS PRN
Status: DISCONTINUED | OUTPATIENT
Start: 2020-01-20 | End: 2020-01-21 | Stop reason: HOSPADM

## 2020-01-20 RX ORDER — NEOSTIGMINE METHYLSULFATE 4 MG/4 ML
SYRINGE (ML) INTRAVENOUS AS NEEDED
Status: DISCONTINUED | OUTPATIENT
Start: 2020-01-20 | End: 2020-01-20 | Stop reason: SURG

## 2020-01-20 RX ORDER — SODIUM CHLORIDE, SODIUM LACTATE, POTASSIUM CHLORIDE, CALCIUM CHLORIDE 600; 310; 30; 20 MG/100ML; MG/100ML; MG/100ML; MG/100ML
125 INJECTION, SOLUTION INTRAVENOUS CONTINUOUS
Status: DISCONTINUED | OUTPATIENT
Start: 2020-01-20 | End: 2020-01-20 | Stop reason: SDUPTHER

## 2020-01-20 RX ADMIN — FENTANYL CITRATE 50 MCG: 50 INJECTION, SOLUTION INTRAMUSCULAR; INTRAVENOUS at 14:37

## 2020-01-20 RX ADMIN — GLYCOPYRROLATE 0.6 MG: 0.2 INJECTION, SOLUTION INTRAMUSCULAR; INTRAVITREAL at 15:17

## 2020-01-20 RX ADMIN — MIDAZOLAM HYDROCHLORIDE 2 MG: 2 INJECTION, SOLUTION INTRAMUSCULAR; INTRAVENOUS at 13:15

## 2020-01-20 RX ADMIN — FENTANYL CITRATE 25 MCG: 50 INJECTION, SOLUTION INTRAMUSCULAR; INTRAVENOUS at 14:30

## 2020-01-20 RX ADMIN — HYDROMORPHONE HYDROCHLORIDE: 2 INJECTION INTRAMUSCULAR; INTRAVENOUS; SUBCUTANEOUS at 16:17

## 2020-01-20 RX ADMIN — FENTANYL CITRATE 25 MCG: 50 INJECTION, SOLUTION INTRAMUSCULAR; INTRAVENOUS at 14:20

## 2020-01-20 RX ADMIN — ROCURONIUM BROMIDE 5 MG: 10 INJECTION INTRAVENOUS at 13:20

## 2020-01-20 RX ADMIN — SUCCINYLCHOLINE CHLORIDE 180 MG: 20 INJECTION, SOLUTION INTRAMUSCULAR; INTRAVENOUS at 13:20

## 2020-01-20 RX ADMIN — ROCURONIUM BROMIDE 30 MG: 10 INJECTION INTRAVENOUS at 13:28

## 2020-01-20 RX ADMIN — ROCURONIUM BROMIDE 15 MG: 10 INJECTION INTRAVENOUS at 14:06

## 2020-01-20 RX ADMIN — SODIUM CHLORIDE, POTASSIUM CHLORIDE, SODIUM LACTATE AND CALCIUM CHLORIDE 125 ML/HR: 600; 310; 30; 20 INJECTION, SOLUTION INTRAVENOUS at 17:37

## 2020-01-20 RX ADMIN — Medication 3 MG: at 15:17

## 2020-01-20 RX ADMIN — FLUORESCEIN SODIUM 0.25 ML: 100 INJECTION INTRAVENOUS at 15:11

## 2020-01-20 RX ADMIN — SODIUM CHLORIDE, POTASSIUM CHLORIDE, SODIUM LACTATE AND CALCIUM CHLORIDE 125 ML/HR: 600; 310; 30; 20 INJECTION, SOLUTION INTRAVENOUS at 11:37

## 2020-01-20 RX ADMIN — ROPINIROLE HYDROCHLORIDE 0.5 MG: 0.5 TABLET, FILM COATED ORAL at 21:06

## 2020-01-20 RX ADMIN — SODIUM CHLORIDE, SODIUM GLUCONATE, SODIUM ACETATE, POTASSIUM CHLORIDE, AND MAGNESIUM CHLORIDE: 526; 502; 368; 37; 30 INJECTION, SOLUTION INTRAVENOUS at 14:37

## 2020-01-20 RX ADMIN — FENTANYL CITRATE 50 MCG: 50 INJECTION, SOLUTION INTRAMUSCULAR; INTRAVENOUS at 13:20

## 2020-01-20 RX ADMIN — Medication 2 G: at 13:32

## 2020-01-20 RX ADMIN — FENTANYL CITRATE 50 MCG: 50 INJECTION, SOLUTION INTRAMUSCULAR; INTRAVENOUS at 15:18

## 2020-01-20 RX ADMIN — LIDOCAINE HYDROCHLORIDE 100 MG: 20 INJECTION, SOLUTION INFILTRATION; PERINEURAL at 13:20

## 2020-01-20 RX ADMIN — PROPOFOL 200 MG: 10 INJECTION, EMULSION INTRAVENOUS at 13:20

## 2020-01-20 RX ADMIN — PROPOFOL 50 MG: 10 INJECTION, EMULSION INTRAVENOUS at 15:13

## 2020-01-20 RX ADMIN — METOPROLOL TARTRATE 25 MG: 25 TABLET ORAL at 21:06

## 2020-01-20 RX ADMIN — ONDANSETRON 4 MG: 2 INJECTION INTRAMUSCULAR; INTRAVENOUS at 15:19

## 2020-01-20 RX ADMIN — FENTANYL CITRATE 50 MCG: 50 INJECTION, SOLUTION INTRAMUSCULAR; INTRAVENOUS at 15:05

## 2020-01-20 RX ADMIN — KETOROLAC TROMETHAMINE 30 MG: 30 INJECTION, SOLUTION INTRAMUSCULAR at 15:26

## 2020-01-20 RX ADMIN — DEXAMETHASONE SODIUM PHOSPHATE 4 MG: 4 INJECTION, SOLUTION INTRAMUSCULAR; INTRAVENOUS at 13:20

## 2020-01-20 NOTE — ANESTHESIA PROCEDURE NOTES
Airway  Urgency: elective    Date/Time: 1/20/2020 1:26 PM    General Information and Staff    Patient location during procedure: OR    Indications and Patient Condition  Indications for airway management: airway protection    Preoxygenated: yes      Final Airway Details  Final airway type: endotracheal airway      Successful airway: ETT    Successful intubation technique: direct laryngoscopy  Facilitating devices/methods: intubating stylet  Endotracheal tube insertion site: oral  Blade: [place]  Blade size: 3  ETT size (mm): 7.5  Cormack-Lehane Classification: grade IIa - partial view of glottis  Placement verified by: chest auscultation and capnometry   Measured from: lips  ETT/EBT  to lips (cm): 22  Number of attempts at approach: 1  Assessment: lips, teeth, and gum same as pre-op and atraumatic intubation

## 2020-01-20 NOTE — OP NOTE
OPERATIVE NOTE  Erica Sorenson Lucius  1980 1/20/2020    PREOP DIAGNOSES:  Abnormal uterine bleeding (AUB) [N93.9]  Pelvic pain [R10.2]  Intramural leiomyoma of uterus [D25.1]    POSTOP DIAGNOSES:  Post-Op Diagnosis Codes:     * Abnormal uterine bleeding (AUB) [N93.9]     * Pelvic pain [R10.2]     * Intramural leiomyoma of uterus [D25.1]    Procedure(s):  TOTAL ABDOMINAL HYSTERECTOMY, RIGHT SALPINGO-OOPHORECTOMY AND LEFT SALPINGECTOMY    SURGEON: Severo Aquino DO FACOG    ASSISTANT: Negra Mathias CSA      STAFF:   Circulator: Gladys Jiang RN  Scrub Person: Marilee Hernández  Assistant: Negra Mathias CSA    ANESTHESIA: Choice    ANESTHESIA STAFF:  Anesthesiologist: Wilfredo Judd MD  CRNA: Anish Haddad CRNA    ESTIMATED BLOOD LOSS: 200 ml     SPECIMEN:   ID Type Source Tests Collected by Time   A (Not marked as sent) : left tube Tissue Fallopian Tube, Left TISSUE PATHOLOGY EXAM Severo Aquino,  1/20/2020 1416   B (Not marked as sent) : right tube and ovary Tissue Ovary, Right with Fallopian Tube TISSUE PATHOLOGY EXAM Severo Aquino,  1/20/2020 1417   C (Not marked as sent) : uterus and cervix Tissue Uterus with Cervix TISSUE PATHOLOGY EXAM Severo Aquino, DO 1/20/2020 1417       FINDINGS: Normal-appearing uterus with minimal adhesions to the anterior wall.  Right hemorrhagic cyst on the right ovary left simple cyst on the left ovary both approximately 1 cm in size.  E flux noted from ureteral orifices bilaterally    COMPLICATIONS: None    DESCRIPTION OF OPERATION:   After obtaining informed consent the patient was taken to the operating room where general endotracheal anesthesia was found to be adequate, she was then prepped and draped in the normal sterile fashion in the low lithotomy position.  A final timeout was performed and preoperative antibiotics were given.  Attention was turned to the abdomen and a Pfannenstiel incision was made and carried through to the underlying  layer of fascia sharply.  The fascia was incised in the midline and the incision was extended laterally with Bovie.  The superior aspect of the fascia was grasped with Ellen clamps and elevated and the rectus muscles were dissected off bluntly.  The Kocher clamps were then placed on the inferior portion of the fascia and the rectus muscles were dissected off bluntly inferiorly.  The peritoneum was identified and elevated with Zahraa clamps and entered sharply with Metzenbaum scissors.  A large omental adhesion was noted approximately 2 cm superior to the Pfannenstiel incision.  This was taken down with the Enseal device.  Once the omental adhesions were taken down.  The O'Jacobo-O'Todd retractor was placed without difficulty.  The inferior blade was used to retract the bladder.  The bowel was packed superiorly with lap sponges and a retractor was placed.  The uterus was visualized without difficulty.  The triple pedicles were grasped Pomeroy-Péan clamps and the uterus was elevated.  Attention was turned to the left fallopian tube and the mesosalpinx was cauterized and transected using the Enseal device.  Attention was then turned to the right round ligament the right round ligament was tagged with an 0 Vicryl cauterized and transected using the Enseal device.  The same procedure was then performed on the left round ligament.  The left utero-ovarian artery was then identified cauterized and transected.  The right infundibulopelvic ligament was identified cauterized and transected using the Enseal device.  The right utero-ovarian ligament was then cauterized and transected and the right tube and ovary were passed off the operative field.  A bladder flap was then created from the left to the right using right angle clamps and Bovie cautery.  The bladder was dissected off of the cervix and pushed inferiorly with a sponge stick.  The left uterine artery was then cauterized and transected using the Enseal device the  same procedure was done on the right.  The right cardinal ligament was then clamped and cauterized using the Enseal device and cut.  The same procedure was performed on the left.  The broad ligament was then cauterized and transected on the right using the Enseal device the same procedure was done on the left.  The uterosacral ligaments were then clamped using a sharply curved zeppelin clamp first from the left to the right and right to the left.  Lyssa scissors were then used to amputate the uterus.  Anne sutures were placed at the angles of the vagina with hemostasis noted.  5 additional figure-of-eight 0 Vicryl sutures were used to close the the vagina.  The pelvis was copiously irrigated with hemostasis noted.  Surgicel powder was placed over the hysterotomy site and hemostasis was continued to be noted the O'Jacobo-O'Todd retractor was removed and the sponge packing laps were removed as well.  The fascia was closed with looped PDS starting from right to midline and then left to midline and tied in the middle.  The Aruna's fascia was closed using 3-0 Vicryl.  Skin was closed using 4-0 Monocryl.  A dressing was placed over the incision.  Attention was turned to the vagina and a cystoscopy was performed with E flux noted from the ureteral orifices bilaterally.  A vaginal sweep was performed with nothing in the vagina.  Sponge lap and needle count were correct x2.  The patient tolerated the procedure well, she was transferred to the recovery room in stable condition.          This document has been electronically signed by Severo Aquino DO on January 20, 2020 3:30 PM

## 2020-01-20 NOTE — ANESTHESIA PREPROCEDURE EVALUATION
Anesthesia Evaluation     no history of anesthetic complications:  NPO Solid Status: > 8 hours  NPO Liquid Status: > 4 hours           Airway   Mallampati: I  TM distance: >3 FB  Neck ROM: full  No difficulty expected  Dental - normal exam         Pulmonary - normal exam    breath sounds clear to auscultation  (+) a smoker (1 ppd) Current Abstained day of surgery, sleep apnea on CPAP,   (-) COPD, asthma  Cardiovascular   Exercise tolerance: good (4-7 METS)    Patient on routine beta blocker and Beta blocker given within 24 hours of surgery  Rhythm: regular  Rate: normal    (+) hypertension less than 2 medications,   (-) valvular problems/murmurs, dysrhythmias, angina, murmur, cardiac stents, DVT, hyperlipidemia      Neuro/Psych  (+) headaches (hx of migraines), psychiatric history (no meds) Anxiety,     (-) seizures, TIA, CVA  GI/Hepatic/Renal/Endo    (+) morbid obesity,    (-) GERD, hepatitis, liver disease, no renal disease, diabetes, no thyroid disorder    Musculoskeletal     Abdominal   (+) obese,    Substance History   (-) alcohol use, drug use     OB/GYN    (-)  Pregnant (s/p tubal ligation)    Comment: Abnormal uterine bleeding      Other        (-) history of cancer                  Anesthesia Plan    ASA 3     general   (Very anxious)  intravenous induction     Anesthetic plan, all risks, benefits, and alternatives have been provided, discussed and informed consent has been obtained with: patient, spouse/significant other and child.  Use of blood products discussed with patient  Consented to blood products.

## 2020-01-20 NOTE — ANESTHESIA POSTPROCEDURE EVALUATION
Patient: Erica Kan    Procedure Summary     Date:  01/20/20 Room / Location:  [facility] OR [address] [place] [address] OR    Anesthesia Start:  1315 Anesthesia Stop:  1554    Procedure:  TOTAL ABDOMINAL HYSTERECTOMY, RIGHT SALPINGO-OOPHORECTOMY AND LEFT SALPINGECTOMY (N/A Abdomen) Diagnosis:       Abnormal uterine bleeding (AUB)      Pelvic pain      Intramural leiomyoma of uterus      (Abnormal uterine bleeding (AUB) [N93.9])      (Pelvic pain [R10.2])      (Intramural leiomyoma of uterus [D25.1])    Surgeon:  Severo Aquino DO Provider:  Wilfredo Judd MD    Anesthesia Type:  general ASA Status:  3          Anesthesia Type: general    Vitals  Vitals Value Taken Time   /57 1/20/2020  3:46 PM   Temp 97.3 °F (36.3 °C) 1/20/2020  3:46 PM   Pulse 64 1/20/2020  3:46 PM   Resp 20 1/20/2020  3:46 PM   SpO2 99 % 1/20/2020  3:46 PM           Post Anesthesia Care and Evaluation    Patient location during evaluation: PACU  Patient participation: complete - patient participated  Level of consciousness: awake and awake and alert  Pain score: 0  Pain management: satisfactory to patient  Airway patency: patent  Anesthetic complications: No anesthetic complications  PONV Status: none  Cardiovascular status: acceptable and stable  Respiratory status: acceptable, room air and spontaneous ventilation  Hydration status: acceptable

## 2020-01-21 VITALS
SYSTOLIC BLOOD PRESSURE: 121 MMHG | WEIGHT: 255.51 LBS | HEART RATE: 69 BPM | RESPIRATION RATE: 18 BRPM | OXYGEN SATURATION: 99 % | DIASTOLIC BLOOD PRESSURE: 64 MMHG | TEMPERATURE: 98.1 F | HEIGHT: 65 IN | BODY MASS INDEX: 42.57 KG/M2

## 2020-01-21 LAB
DEPRECATED RDW RBC AUTO: 41.3 FL (ref 37–54)
ERYTHROCYTE [DISTWIDTH] IN BLOOD BY AUTOMATED COUNT: 13 % (ref 12.3–15.4)
HCT VFR BLD AUTO: 34.9 % (ref 34–46.6)
HGB BLD-MCNC: 11.7 G/DL (ref 12–15.9)
MCH RBC QN AUTO: 29.1 PG (ref 26.6–33)
MCHC RBC AUTO-ENTMCNC: 33.5 G/DL (ref 31.5–35.7)
MCV RBC AUTO: 86.8 FL (ref 79–97)
PLATELET # BLD AUTO: 400 10*3/MM3 (ref 140–450)
PMV BLD AUTO: 9.3 FL (ref 6–12)
RBC # BLD AUTO: 4.02 10*6/MM3 (ref 3.77–5.28)
WBC NRBC COR # BLD: 17.97 10*3/MM3 (ref 3.4–10.8)

## 2020-01-21 PROCEDURE — 25010000002 KETOROLAC TROMETHAMINE PER 15 MG: Performed by: OBSTETRICS & GYNECOLOGY

## 2020-01-21 PROCEDURE — 85027 COMPLETE CBC AUTOMATED: CPT | Performed by: OBSTETRICS & GYNECOLOGY

## 2020-01-21 PROCEDURE — 99024 POSTOP FOLLOW-UP VISIT: CPT | Performed by: OBSTETRICS & GYNECOLOGY

## 2020-01-21 RX ORDER — OXYCODONE HYDROCHLORIDE 5 MG/1
5 TABLET ORAL EVERY 4 HOURS PRN
Qty: 10 TABLET | Refills: 0 | Status: SHIPPED | OUTPATIENT
Start: 2020-01-21

## 2020-01-21 RX ORDER — ACETAMINOPHEN 500 MG
1000 TABLET ORAL EVERY 8 HOURS
Qty: 30 TABLET | Refills: 1 | Status: SHIPPED | OUTPATIENT
Start: 2020-01-21

## 2020-01-21 RX ORDER — IBUPROFEN 800 MG/1
800 TABLET ORAL EVERY 8 HOURS PRN
Qty: 30 TABLET | Refills: 1 | Status: SHIPPED | OUTPATIENT
Start: 2020-01-21

## 2020-01-21 RX ADMIN — KETOROLAC TROMETHAMINE 30 MG: 30 INJECTION, SOLUTION INTRAMUSCULAR; INTRAVENOUS at 01:00

## 2020-01-21 RX ADMIN — METOPROLOL TARTRATE 25 MG: 25 TABLET ORAL at 08:31

## 2020-01-21 NOTE — PLAN OF CARE
Problem: Patient Care Overview  Goal: Plan of Care Review  Outcome: Ongoing (interventions implemented as appropriate)  Flowsheets  Taken 1/20/2020 2009  Plan of Care Reviewed With: patient  Taken 1/21/2020 0424  Outcome Summary: Pt ambulating in room, passing gas, tolerating PO intake, voiding, pain controlled

## 2020-01-21 NOTE — PROGRESS NOTES
[facility]  Erica Kan  : 1980  MRN: [id number]  CSN: [id number]    Post-operative Day #1  Subjective   Her pain is well controlled.  Vaginal bleeding is essentially absent.  She is passing gas and has not had a bowel movement.  She is ambulating without difficulty, tolerating regular diet, urinating without difficulty.     Objective     Min/max vitals past 24 hours:   Temp  Min: 97.1 °F (36.2 °C)  Max: 98.4 °F (36.9 °C)  BP  Min: 105/52  Max: 164/72  Pulse  Min: 62  Max: 93  Pulse  Min: 62  Max: 93        General: well developed; well nourished  no acute distress   Abdomen: soft, non-tender; no masses   Pelvic: Not performed   Ext: Calves NT     Lab Results   Component Value Date    WBC 17.97 (H) 2020    HGB 11.7 (L) 2020    HCT 34.9 2020    MCV 86.8 2020     2020    RH Positive 2020        Assessment   1. S/P total abdominal hysterectomy, doing well and healing appropriately.  Appropriate drop in H&H.  Vital signs are stable.     Plan   1. Continue routine post-operative care   2. Continue regular diet  3. Encourage ambulation and incentive spirometry  4. Discharge to home today.      This document has been electronically signed by Severo Aquino DO on 2020 7:17 AM

## 2020-01-21 NOTE — DISCHARGE INSTRUCTIONS
Report temp above 100.4 ,nausea, vomiting ,dizziness, painful urinations, bright red vaginal bleeding,yellow drainage from incision with fowl smell.

## 2020-01-21 NOTE — DISCHARGE SUMMARY
[facility]  Erica Kan  : 1980  MRN: [id number]  CSN: [id number]    Discharge Summary      Date of Admission: 2020   Date of Discharge: 2020   Discharge Diagnosis: 1.  Pelvic pain and abnormal uterine bleeding   Procedures Performed: Procedure(s):  TOTAL ABDOMINAL HYSTERECTOMY, RIGHT SALPINGO-OOPHORECTOMY AND LEFT SALPINGECTOMY      Brief History: Patient is a 39 y.o.who presented for scheduled hysterectomy.  Patient underwent uncomplicated abdominal hysterectomy and had an uncomplicated postoperative course.   Hospital Course: Her hospital course has been uneventful.  Today, on postoperative day # 1, she is tolerating a regular diet, ambulating without assistance, and desires to go home.  Grajeda catheter was removed this morning, and she has urinated without difficulty. She has had no fever, and her pain is controlled.  She has had no nausea or vomiting.   Pending Studies: Tissue Pathology   Condition at Discharge: Stable   Discharge Diet: Diet Instructions     Diet: Regular      Discharge Diet:  Regular         Discharge Activity: Activity Instructions     Activity as Tolerated      Other Activity Restrictions      No driving for 5 days or while on narcotic pain medications. Riding is car is fine.  No lifting more than 25 pounds for 4 weeks.  No soaking in bathtub for 6 weeks, showers are fine.  Nothing in vagina including tampons/sexual intercourse for 6 weeks.  May return to work/school in 4 weeks as tolerated.    Type of Restriction:  Other    Explain Other Restrictions:  see below         Discharge Medications:    Your medication list      START taking these medications      Instructions Last Dose Given Next Dose Due   acetaminophen 500 MG tablet  Commonly known as:  TYLENOL      Take 2 tablets by mouth Every 8 (Eight) Hours.       ibuprofen 800 MG tablet  Commonly known as:  ADVIL,MOTRIN      Take 1 tablet by mouth Every 8 (Eight) Hours As Needed for Mild Pain .       oxyCODONE 5  MG immediate release tablet  Commonly known as:  ROXICODONE      Take 1 tablet by mouth Every 4 (Four) Hours As Needed for Moderate Pain .          CONTINUE taking these medications      Instructions Last Dose Given Next Dose Due   fluticasone 27.5 MCG/SPRAY nasal spray  Commonly known as:  VERAMYST      2 sprays into each nostril Daily As Needed for Rhinitis.       hydroCHLOROthiazide 25 MG tablet  Commonly known as:  HYDRODIURIL      Take 25 mg by mouth Daily.       lisinopril 10 MG tablet  Commonly known as:  PRINIVIL,ZESTRIL      Take 10 mg by mouth Daily. 12n       metoprolol tartrate 25 MG tablet  Commonly known as:  LOPRESSOR      Take 25 mg by mouth 2 (Two) Times a Day.       rOPINIRole 0.5 MG tablet  Commonly known as:  REQUIP      Take 0.5 mg by mouth Every Night. Take 1 hour before bedtime.             Where to Get Your Medications      These medications were sent to [facility] [place], [place] - [address] [facility] - [phone] [facility] [phone]   [address] [facility] [address]    Phone:  [phone] ·   acetaminophen 500 MG tablet  · ibuprofen 800 MG tablet  · oxyCODONE 5 MG immediate release tablet        Discharge Disposition: home   Follow-up: Future Appointments   Date Time Provider Department Center   1/28/2020 10:00 AM Severo Aquino DO [facility] None            This note has been electronically signed.    Severo Aquino DO  January 21, 2020  4:44 PM

## 2020-01-22 NOTE — PAYOR COMM NOTE
"[facility]  (P)[phone]  (F)[phone]    auth#[id number]        Erica Kan (39 y.o. Female)     Date of Birth Social Security Number Address Home Phone MRN    1980  [address] [place] [id number] [phone] [id number]    Sabianist Marital Status          Restorationism        Admission Date Admission Type Admitting Provider Attending Provider Department, Room/Bed    20 Elective Severo Aquino DO  [facility] MOTHER BABY, M765/    Discharge Date Discharge Disposition Discharge Destination        2020 Home or Self Care              Attending Provider:  (none)   Allergies:  Benadryl [Diphenhydramine Hcl (Sleep)], Latex    Isolation:  None   Infection:  None   Code Status:  Prior    Ht:  165.1 cm (65\")   Wt:  116 kg (255 lb 8.2 oz)    Admission Cmt:  None   Principal Problem:  None                Active Insurance as of 2020     Primary Coverage     Payor Plan Insurance Group Employer/Plan Group    [employer] [place] [employer] [place]ICA[place]      Payor Plan Address Payor Plan Phone Number Payor Plan Fax Number Effective Dates    [address] [address] [phone]  2019 - None Entered    [facility] [id number]       Subscriber Name Subscriber Birth Date Member ID       ERICA KAN 1980 [id number]                 Emergency Contacts      (Rel.) Home Phone Work Phone Mobile Phone    Amaris Schaeffer (Mother) [phone] -- [phone]               Discharge Summary      Severo Aquino DO at 20 45          [facility]  Erica Kan  : 1980  MRN: [id number]  CSN: [id number]    Discharge Summary      Date of Admission: 2020   Date of Discharge: 2020   Discharge Diagnosis: 1.  Pelvic pain and abnormal uterine bleeding   Procedures Performed: Procedure(s):  TOTAL ABDOMINAL HYSTERECTOMY, RIGHT SALPINGO-OOPHORECTOMY AND LEFT SALPINGECTOMY      Brief History: Patient is a 39 " keronwho presented for scheduled hysterectomy.  Patient underwent uncomplicated abdominal hysterectomy and had an uncomplicated postoperative course.   Hospital Course: Her hospital course has been uneventful.  Today, on postoperative day # 1, she is tolerating a regular diet, ambulating without assistance, and desires to go home.  Grajeda catheter was removed this morning, and she has urinated without difficulty. She has had no fever, and her pain is controlled.  She has had no nausea or vomiting.   Pending Studies: Tissue Pathology   Condition at Discharge: Stable   Discharge Diet: Diet Instructions     Diet: Regular      Discharge Diet:  Regular         Discharge Activity: Activity Instructions     Activity as Tolerated      Other Activity Restrictions      No driving for 5 days or while on narcotic pain medications. Riding is car is fine.  No lifting more than 25 pounds for 4 weeks.  No soaking in bathtub for 6 weeks, showers are fine.  Nothing in vagina including tampons/sexual intercourse for 6 weeks.  May return to work/school in 4 weeks as tolerated.    Type of Restriction:  Other    Explain Other Restrictions:  see below         Discharge Medications:    Your medication list      START taking these medications      Instructions Last Dose Given Next Dose Due   acetaminophen 500 MG tablet  Commonly known as:  TYLENOL      Take 2 tablets by mouth Every 8 (Eight) Hours.       ibuprofen 800 MG tablet  Commonly known as:  ADVIL,MOTRIN      Take 1 tablet by mouth Every 8 (Eight) Hours As Needed for Mild Pain .       oxyCODONE 5 MG immediate release tablet  Commonly known as:  ROXICODONE      Take 1 tablet by mouth Every 4 (Four) Hours As Needed for Moderate Pain .          CONTINUE taking these medications      Instructions Last Dose Given Next Dose Due   fluticasone 27.5 MCG/SPRAY nasal spray  Commonly known as:  VERAMYST      2 sprays into each nostril Daily As Needed for Rhinitis.       hydroCHLOROthiazide 25 MG  tablet  Commonly known as:  HYDRODIURIL      Take 25 mg by mouth Daily.       lisinopril 10 MG tablet  Commonly known as:  PRINIVIL,ZESTRIL      Take 10 mg by mouth Daily. 12n       metoprolol tartrate 25 MG tablet  Commonly known as:  LOPRESSOR      Take 25 mg by mouth 2 (Two) Times a Day.       rOPINIRole 0.5 MG tablet  Commonly known as:  REQUIP      Take 0.5 mg by mouth Every Night. Take 1 hour before bedtime.             Where to Get Your Medications      These medications were sent to [facility] [place], [place] - [address] [facility] - [phone]  - [phone]   [address] [facility] [address]    Phone:  [phone] ·   acetaminophen 500 MG tablet  · ibuprofen 800 MG tablet  · oxyCODONE 5 MG immediate release tablet        Discharge Disposition: home   Follow-up: Future Appointments   Date Time Provider Department Center   1/28/2020 10:00 AM Jena Medina DO [facility] None            This note has been electronically signed.    Jena Medina DO  January 21, 2020  4:44 PM            Electronically signed by Jena Medina DO at 01/21/20 1644       Discharge Order (From admission, onward)     Start     Ordered    01/21/20 0722  Discharge patient  Once     Expected Discharge Date:  01/21/20    Discharge Disposition:  Home or Self Care    Physician of Record for Attribution - Please select from Treatment Team:  JENA MEDINA [[id number]]    Review needed by CMO to determine Physician of Record:  No       Question Answer Comment   Physician of Record for Attribution - Please select from Treatment Team JENA MEDINA    Review needed by CMO to determine Physician of Record No        01/21/20 2299

## 2020-01-23 LAB
LAB AP CASE REPORT: NORMAL
PATH REPORT.FINAL DX SPEC: NORMAL
PATH REPORT.GROSS SPEC: NORMAL

## 2020-01-28 ENCOUNTER — OFFICE VISIT (OUTPATIENT)
Dept: OBSTETRICS AND GYNECOLOGY | Facility: CLINIC | Age: 40
End: 2020-01-28

## 2020-01-28 VITALS
DIASTOLIC BLOOD PRESSURE: 75 MMHG | WEIGHT: 256 LBS | HEIGHT: 65 IN | BODY MASS INDEX: 42.65 KG/M2 | SYSTOLIC BLOOD PRESSURE: 118 MMHG

## 2020-01-28 DIAGNOSIS — Z09 POSTOPERATIVE FOLLOW-UP: ICD-10-CM

## 2020-01-28 DIAGNOSIS — N93.9 ABNORMAL UTERINE BLEEDING (AUB): ICD-10-CM

## 2020-01-28 DIAGNOSIS — R10.2 PELVIC PAIN: ICD-10-CM

## 2020-01-28 DIAGNOSIS — D25.1 INTRAMURAL LEIOMYOMA OF UTERUS: Primary | ICD-10-CM

## 2020-01-28 PROCEDURE — 99024 POSTOP FOLLOW-UP VISIT: CPT | Performed by: OBSTETRICS & GYNECOLOGY

## 2020-01-28 NOTE — PROGRESS NOTES
Patient is a 39-year-old female status post total abdominal hysterectomy with RSO and bilateral salpingectomy approximately 1 week ago, doing well, pain is well controlled at this time.  Urinating without difficulty, having normal bowel movements.  Denies any fevers, denies any vaginal bleeding.  Reviewed pathology which was overall normal other than adenomyosis.    Vital signs reviewed  Awake and oriented x3  No acute distress  Abdomen soft appropriately tender, Pfannenstiel incision healing appropriate this time incision dressing removed.    Doing well and recovering appropriately.  Patient to return to see me in 5 weeks, sooner as needed.  Bleeding and fever precautions given.  May slowly increase activity at this time.

## 2025-04-07 ENCOUNTER — TELEPHONE (OUTPATIENT)
Dept: HEMATOLOGY | Age: 45
End: 2025-04-07

## 2025-04-07 NOTE — TELEPHONE ENCOUNTER
Called Patient and reminded patient of their appointment on 04/11/2025 and patient confirmed they would be here. Reminded patient to just come at appointment time, and to not come at the lab appointment time. Reminded patient that we will not check them in any more than 30 minutes before appointment time.  We have now moved to the [facility] [place] that is located between our old office and the ER at the [facility]. Letting the Pt know that our front entrance faces the  [employer] [place]. Reminded pt to eat well and be well hydrated for their labs.

## 2025-04-10 NOTE — PROGRESS NOTES
MEDICAL ONCOLOGY CONSULTATION    Patient Name: Elvia Lynch  MRN: [id number]  YOB: 1980  Date of evaluation: 4/11/2025    REASON FOR CONSULTATION:  Lymphoma  REQUESTING PHYSICIAN: Dr Calista Hernandez/[facility]     History Obtained From:  patient and old medical records    HISTORY OF PRESENT ILLNESS:    Diagnosis  Diffuse large B-cell lymphoma, liver, March 2025  Germinal center type, High grade  Ki-67 of 80%, Non double expressor  FISH: BCL-6 Rearrangement, Negative rearrangement BCL2 and MYC   Stage IV  IPI III    Treatment Summary  Anticipate R-CHOP with Rituxan, Cyclophosphamide, Adriamycin, Vincristine, Prednisone + GCSF every 3 weeks x6 cycles, per recommendations from Dr Bryan Merritt/[facility] Oncology    Cancer History  Elvia Lynch was first seen by me on 4/11/2025.  She was referred by Dr. Bryan Merritt, [facility] for a diagnosis of diffuse large B-cell lymphoma, high-grade with rearrangement cell 6 send no rearrangement of c-Myc or Bcl-2.  Patient stage IV disease.  Patient was recommended 6 cycles of R-CHOP.  2/17/25 CT abd/pelvis ([facility]): Findings are compatible with subtle diffuse hepatic metastatic disease, likely from a neoplastic process within the left epigastrum.\"    3/3/25 Initial evaluation by Dr Calista Hernandez/[facility] Oncology for multiple subtle liver lesions throughout the liver, a large multilobulated soft tissue mass present within the left epigastrum and prominent retroperitoneal lymph nodes present. It was recommended for patient to have a biopsy and depending on results, the case may be discussed at tumor board.  3/21/25 Liver mass core biopsy: large B cell lymphoma that is germinal center subtype. Some high grade features with high N:C ratio as well as increased necrosis and areas of apoptosis. Ki-67 of 80%. FISH (3/21/25): rearrangement in BCL6 with extra copies in BCL2 and MYC   4/3/25 Initial evaluation by Dr Bryan Merritt/[facility] Oncology who reviewed pathology with patient and recommended R-CHOP

## 2025-04-11 ENCOUNTER — OFFICE VISIT (OUTPATIENT)
Dept: HEMATOLOGY | Age: 45
End: 2025-04-11

## 2025-04-11 ENCOUNTER — HOSPITAL ENCOUNTER (OUTPATIENT)
Dept: INFUSION THERAPY | Age: 45
Discharge: HOME | End: 2025-04-11
Payer: MEDICAID

## 2025-04-11 VITALS
HEIGHT: 65 IN | DIASTOLIC BLOOD PRESSURE: 78 MMHG | OXYGEN SATURATION: 98 % | SYSTOLIC BLOOD PRESSURE: 124 MMHG | RESPIRATION RATE: 20 BRPM | BODY MASS INDEX: 39.79 KG/M2 | HEART RATE: 78 BPM | TEMPERATURE: 97.7 F | WEIGHT: 238.8 LBS

## 2025-04-11 DIAGNOSIS — Z51.11 CHEMOTHERAPY MANAGEMENT, ENCOUNTER FOR: ICD-10-CM

## 2025-04-11 DIAGNOSIS — C83.33 DIFFUSE LARGE B-CELL LYMPHOMA OF INTRA-ABDOMINAL LYMPH NODES (HCC): Primary | ICD-10-CM

## 2025-04-11 DIAGNOSIS — Z91.89 AT RISK FOR CARDIAC COMPLICATION: ICD-10-CM

## 2025-04-11 DIAGNOSIS — Z91.89 AT HIGH RISK OF TUMOR LYSIS SYNDROME: ICD-10-CM

## 2025-04-11 DIAGNOSIS — Z79.899 ENCOUNTER FOR MONITORING CARDIOTOXIC DRUG THERAPY: ICD-10-CM

## 2025-04-11 DIAGNOSIS — Z01.818 EXAMINATION PRIOR TO CHEMOTHERAPY: ICD-10-CM

## 2025-04-11 DIAGNOSIS — C83.38 DIFFUSE LARGE B-CELL LYMPHOMA OF LYMPH NODES OF MULTIPLE REGIONS (HCC): Primary | ICD-10-CM

## 2025-04-11 DIAGNOSIS — Z51.81 ENCOUNTER FOR MONITORING CARDIOTOXIC DRUG THERAPY: ICD-10-CM

## 2025-04-11 DIAGNOSIS — Z71.89 CARE PLAN DISCUSSED WITH PATIENT: ICD-10-CM

## 2025-04-11 PROCEDURE — 99203 OFFICE O/P NEW LOW 30 MIN: CPT

## 2025-04-11 RX ORDER — PREDNISONE 50 MG/1
TABLET ORAL
Qty: 60 TABLET | Refills: 0 | Status: SHIPPED | OUTPATIENT
Start: 2025-04-11

## 2025-04-11 RX ORDER — CETIRIZINE HYDROCHLORIDE 10 MG/1
10 TABLET ORAL PRN
COMMUNITY
Start: 2025-02-10

## 2025-04-11 RX ORDER — OLANZAPINE 10 MG/1
TABLET ORAL
Qty: 24 TABLET | Refills: 0 | Status: SHIPPED | OUTPATIENT
Start: 2025-04-11

## 2025-04-11 RX ORDER — HYDROXYZINE HYDROCHLORIDE 10 MG/1
10 TABLET, FILM COATED ORAL PRN
COMMUNITY
Start: 2025-02-17

## 2025-04-11 RX ORDER — ONDANSETRON 4 MG/1
4 TABLET, FILM COATED ORAL EVERY 6 HOURS PRN
Qty: 30 TABLET | Refills: 5 | Status: SHIPPED | OUTPATIENT
Start: 2025-04-11

## 2025-04-11 RX ORDER — ALLOPURINOL 100 MG/1
200 TABLET ORAL DAILY
Qty: 60 TABLET | Refills: 3 | Status: SHIPPED | OUTPATIENT
Start: 2025-04-11

## 2025-04-11 RX ORDER — HYDROCHLOROTHIAZIDE 25 MG/1
25 TABLET ORAL DAILY
COMMUNITY
Start: 2025-02-11

## 2025-04-11 RX ORDER — METOPROLOL TARTRATE 50 MG
50 TABLET ORAL 2 TIMES DAILY
COMMUNITY
Start: 2025-02-11

## 2025-04-11 RX ORDER — PROMETHAZINE HYDROCHLORIDE 25 MG/1
12.5 TABLET ORAL EVERY 6 HOURS PRN
Qty: 30 TABLET | Refills: 5 | Status: SHIPPED | OUTPATIENT
Start: 2025-04-11

## 2025-04-14 ENCOUNTER — CLINICAL DOCUMENTATION (OUTPATIENT)
Facility: HOSPITAL | Age: 45
End: 2025-04-14

## 2025-04-14 NOTE — PROGRESS NOTES
Patient Assistance                   Additional notes: Reviewed chart and no assistance is needed. Patient had [place] Medicaid.

## 2025-04-17 ENCOUNTER — HOSPITAL ENCOUNTER (OUTPATIENT)
Dept: PREADMISSION TESTING | Age: 45
Discharge: HOME | End: 2025-04-21
Payer: MEDICAID

## 2025-04-17 ENCOUNTER — PREP FOR PROCEDURE (OUTPATIENT)
Dept: SURGERY | Age: 45
End: 2025-04-17

## 2025-04-17 ENCOUNTER — OFFICE VISIT (OUTPATIENT)
Dept: SURGERY | Age: 45
End: 2025-04-17
Payer: MEDICAID

## 2025-04-17 VITALS
BODY MASS INDEX: 39.44 KG/M2 | HEART RATE: 71 BPM | TEMPERATURE: 97.4 F | HEIGHT: 64 IN | OXYGEN SATURATION: 99 % | WEIGHT: 231 LBS

## 2025-04-17 DIAGNOSIS — I10 ESSENTIAL HYPERTENSION: ICD-10-CM

## 2025-04-17 DIAGNOSIS — C83.33 DIFFUSE LARGE B-CELL LYMPHOMA OF INTRA-ABDOMINAL LYMPH NODES (HCC): Primary | ICD-10-CM

## 2025-04-17 LAB
EKG P AXIS: 26 DEGREES
EKG P-R INTERVAL: 142 MS
EKG Q-T INTERVAL: 412 MS
EKG QRS DURATION: 94 MS
EKG QTC CALCULATION (BAZETT): 419 MS
EKG T AXIS: 33 DEGREES

## 2025-04-17 PROCEDURE — G8427 DOCREV CUR MEDS BY ELIG CLIN: HCPCS | Performed by: SURGERY

## 2025-04-17 PROCEDURE — 93005 ELECTROCARDIOGRAM TRACING: CPT | Performed by: ANESTHESIOLOGY

## 2025-04-17 PROCEDURE — 4004F PT TOBACCO SCREEN RCVD TLK: CPT | Performed by: SURGERY

## 2025-04-17 PROCEDURE — 99203 OFFICE O/P NEW LOW 30 MIN: CPT | Performed by: SURGERY

## 2025-04-17 PROCEDURE — G8417 CALC BMI ABV UP PARAM F/U: HCPCS | Performed by: SURGERY

## 2025-04-17 NOTE — PROGRESS NOTES
[facility]  [facility] GENERAL SURGERY  [address]  [facility] [address]  [phone]     Patient: Elvia Lynch   YOB: 1980   Date: 2025         History of Present Illness  Chief Complaint   Patient presents with    New Patient     B cell Lymphoma       This is a 44 y.o. female presents today complaining of B-cell lymphoma.  She is in need of an Zawshb-k-Krnf for chemotherapy.    I have reviewed the patient's chart including past visits, office notes, hospital reports, procedures, tests, labs, medications, and other reports.     Past Medical History:   Diagnosis Date    Allergies     Anxiety     Cancer (HCC)     Hypertension       Past Surgical History:   Procedure Laterality Date    APPENDECTOMY       SECTION       SECTION       SECTION      CHOLECYSTECTOMY      HYSTERECTOMY, TOTAL ABDOMINAL (CERVIX REMOVED)        Social History     Socioeconomic History    Marital status:    Tobacco Use    Smoking status: Every Day     Current packs/day: 1.00     Average packs/day: 1 pack/day for 29.3 years (29.3 ttl pk-yrs)     Types: Cigarettes     Start date:      Passive exposure: Past    Smokeless tobacco: Never   Vaping Use    Vaping status: Never Used   Substance and Sexual Activity    Alcohol use: Never    Drug use: Never    Sexual activity: Not Currently     Social Drivers of Health      Received from [facility]    Family and Community Support   Intimate Partner Violence: Low Risk  (4/3/2025)    Received from [facility] Historical Interpersonal Safety     Does anyone neglect, hurt, or threaten the patient?: No    Received from [facility]    Housing Stability        Tobacco Use      Smoking status: Every Day        Packs/day: 1.00        Years: 1 pack/day for 29.3 years (29.3 ttl pk-yrs)        Types: Cigarettes        Start date:

## 2025-04-21 ENCOUNTER — HOSPITAL ENCOUNTER (OUTPATIENT)
Age: 45
Discharge: HOME | End: 2025-04-23
Attending: INTERNAL MEDICINE
Payer: MEDICAID

## 2025-04-21 VITALS
SYSTOLIC BLOOD PRESSURE: 114 MMHG | HEIGHT: 64 IN | DIASTOLIC BLOOD PRESSURE: 62 MMHG | BODY MASS INDEX: 39.27 KG/M2 | WEIGHT: 230 LBS

## 2025-04-21 DIAGNOSIS — Z01.818 EXAMINATION PRIOR TO CHEMOTHERAPY: ICD-10-CM

## 2025-04-21 DIAGNOSIS — Z79.899 ENCOUNTER FOR MONITORING CARDIOTOXIC DRUG THERAPY: ICD-10-CM

## 2025-04-21 DIAGNOSIS — Z91.89 AT RISK FOR CARDIAC COMPLICATION: ICD-10-CM

## 2025-04-21 DIAGNOSIS — Z51.81 ENCOUNTER FOR MONITORING CARDIOTOXIC DRUG THERAPY: ICD-10-CM

## 2025-04-21 LAB
ECHO AO ASC DIAM: 3 CM
ECHO AO ASCENDING AORTA INDEX: 1.44 CM/M2
ECHO AO ROOT DIAM: 2.2 CM
ECHO AO ROOT INDEX: 1.06 CM/M2
ECHO AO SINUS VALSALVA DIAM: 3.1 CM
ECHO AO SINUS VALSALVA INDEX: 1.49 CM/M2
ECHO AO ST JNCT DIAM: 2.8 CM
ECHO AV AREA PEAK VELOCITY: 2.8 CM2
ECHO AV AREA VTI: 2.7 CM2
ECHO AV AREA/BSA PEAK VELOCITY: 1.3 CM2/M2
ECHO AV AREA/BSA VTI: 1.3 CM2/M2
ECHO AV MEAN GRADIENT: 6 MMHG
ECHO AV MEAN VELOCITY: 1.2 M/S
ECHO AV PEAK GRADIENT: 10 MMHG
ECHO AV PEAK VELOCITY: 1.6 M/S
ECHO AV VELOCITY RATIO: 0.81
ECHO AV VTI: 42.2 CM
ECHO BSA: 2.17 M2
ECHO IVC PROX: 1.6 CM
ECHO LA AREA 2C: 17.5 CM2
ECHO LA AREA 4C: 20.3 CM2
ECHO LA DIAMETER INDEX: 1.88 CM/M2
ECHO LA DIAMETER: 3.9 CM
ECHO LA MAJOR AXIS: 6.3 CM
ECHO LA MINOR AXIS: 5.5 CM
ECHO LA TO AORTIC ROOT RATIO: 1.77
ECHO LA VOL BP: 54 ML (ref 22–52)
ECHO LA VOL MOD A2C: 44 ML (ref 22–52)
ECHO LA VOL MOD A4C: 59 ML (ref 22–52)
ECHO LA VOL/BSA BIPLANE: 26 ML/M2 (ref 16–34)
ECHO LA VOLUME INDEX MOD A2C: 21 ML/M2 (ref 16–34)
ECHO LA VOLUME INDEX MOD A4C: 28 ML/M2 (ref 16–34)
ECHO LV E' LATERAL VELOCITY: 9.68 CM/S
ECHO LV E' SEPTAL VELOCITY: 12.1 CM/S
ECHO LV EDV 3D: 157 ML
ECHO LV EDV A2C: 125 ML
ECHO LV EDV A4C: 85 ML
ECHO LV EDV INDEX 3D: 75 ML/M2
ECHO LV EDV INDEX A4C: 41 ML/M2
ECHO LV EDV NDEX A2C: 60 ML/M2
ECHO LV EF PHYSICIAN: 60 %
ECHO LV EJECTION FRACTION 3D: 61 %
ECHO LV EJECTION FRACTION A2C: 60 %
ECHO LV EJECTION FRACTION A4C: 59 %
ECHO LV EJECTION FRACTION BIPLANE: 58 % (ref 55–100)
ECHO LV ESV 3D: 61 ML
ECHO LV ESV A2C: 50 ML
ECHO LV ESV A4C: 35 ML
ECHO LV ESV INDEX 3D: 29 ML/M2
ECHO LV ESV INDEX A2C: 24 ML/M2
ECHO LV ESV INDEX A4C: 17 ML/M2
ECHO LV FRACTIONAL SHORTENING: 38 % (ref 28–44)
ECHO LV GLOBAL LONGITUDINAL STRAIN (GLS): -21.4 %
ECHO LV INTERNAL DIMENSION DIASTOLE INDEX: 2.4 CM/M2
ECHO LV INTERNAL DIMENSION DIASTOLIC: 5 CM (ref 3.9–5.3)
ECHO LV INTERNAL DIMENSION SYSTOLIC INDEX: 1.49 CM/M2
ECHO LV INTERNAL DIMENSION SYSTOLIC: 3.1 CM
ECHO LV ISOVOLUMETRIC RELAXATION TIME (IVRT): 85 MS
ECHO LV IVSD: 1.2 CM (ref 0.6–0.9)
ECHO LV MASS 2D: 194.4 G (ref 67–162)
ECHO LV MASS 3D INDEX: 74.5 G/M2
ECHO LV MASS 3D: 155 G
ECHO LV MASS INDEX 2D: 93.5 G/M2 (ref 43–95)
ECHO LV POSTERIOR WALL DIASTOLIC: 0.9 CM (ref 0.6–0.9)
ECHO LV RELATIVE WALL THICKNESS RATIO: 0.36
ECHO LVOT AREA: 3.5 CM2
ECHO LVOT AV VTI INDEX: 0.79
ECHO LVOT DIAM: 2.1 CM
ECHO LVOT MEAN GRADIENT: 4 MMHG
ECHO LVOT PEAK GRADIENT: 7 MMHG
ECHO LVOT PEAK VELOCITY: 1.3 M/S
ECHO LVOT STROKE VOLUME INDEX: 55.6 ML/M2
ECHO LVOT SV: 115.6 ML
ECHO LVOT VTI: 33.4 CM
ECHO MV A VELOCITY: 0.83 M/S
ECHO MV ANNULUS DIAMETER: 2.5 CM
ECHO MV AREA VTI: 3.5 CM2
ECHO MV E DECELERATION TIME (DT): 262 MS
ECHO MV E VELOCITY: 1.12 M/S
ECHO MV E/A RATIO: 1.35
ECHO MV E/E' LATERAL: 11.57
ECHO MV E/E' RATIO (AVERAGED): 10.41
ECHO MV E/E' SEPTAL: 9.26
ECHO MV LVOT VTI INDEX: 1
ECHO MV MAX VELOCITY: 1 M/S
ECHO MV MEAN GRADIENT: 2 MMHG
ECHO MV MEAN VELOCITY: 0.6 M/S
ECHO MV PEAK GRADIENT: 4 MMHG
ECHO MV VTI: 33.3 CM
ECHO RA AREA 4C: 14.4 CM2
ECHO RA END SYSTOLIC VOLUME APICAL 4 CHAMBER INDEX BSA: 17 ML/M2
ECHO RA MAJOR AXIS INDEX: 2.6 CM/M2
ECHO RA MAJOR AXIS: 5.4 CM
ECHO RA MINOR AXIS INDEX: 1.83 CM/M2
ECHO RA MINOR AXIS: 3.8 CM
ECHO RA VOLUME: 35 ML
ECHO RV BASAL DIMENSION: 2.5 CM
ECHO RV INTERNAL DIMENSION: 3.8 CM
ECHO RV LONGITUDINAL DIMENSION: 9.5 CM
ECHO RV MID DIMENSION: 3 CM
ECHO RV TAPSE: 1.8 CM (ref 1.7–?)

## 2025-04-21 PROCEDURE — 93356 MYOCRD STRAIN IMG SPCKL TRCK: CPT

## 2025-04-21 PROCEDURE — 93306 TTE W/DOPPLER COMPLETE: CPT | Performed by: INTERNAL MEDICINE

## 2025-04-21 PROCEDURE — 93356 MYOCRD STRAIN IMG SPCKL TRCK: CPT | Performed by: INTERNAL MEDICINE

## 2025-04-21 PROCEDURE — 76376 3D RENDER W/INTRP POSTPROCES: CPT | Performed by: INTERNAL MEDICINE

## 2025-04-24 RX ORDER — SODIUM CHLORIDE 9 MG/ML
INJECTION, SOLUTION INTRAVENOUS PRN
Status: CANCELLED | OUTPATIENT
Start: 2025-04-24

## 2025-04-24 RX ORDER — SODIUM CHLORIDE 0.9 % (FLUSH) 0.9 %
5-40 SYRINGE (ML) INJECTION EVERY 12 HOURS SCHEDULED
Status: CANCELLED | OUTPATIENT
Start: 2025-04-24

## 2025-04-24 RX ORDER — SODIUM CHLORIDE, SODIUM LACTATE, POTASSIUM CHLORIDE, CALCIUM CHLORIDE 600; 310; 30; 20 MG/100ML; MG/100ML; MG/100ML; MG/100ML
INJECTION, SOLUTION INTRAVENOUS CONTINUOUS
Status: CANCELLED | OUTPATIENT
Start: 2025-04-24

## 2025-04-24 RX ORDER — SODIUM CHLORIDE 0.9 % (FLUSH) 0.9 %
5-40 SYRINGE (ML) INJECTION PRN
Status: CANCELLED | OUTPATIENT
Start: 2025-04-24

## 2025-04-28 DIAGNOSIS — C83.38 DIFFUSE LARGE B-CELL LYMPHOMA OF LYMPH NODES OF MULTIPLE REGIONS (HCC): ICD-10-CM

## 2025-04-28 DIAGNOSIS — Z91.89 AT HIGH RISK OF TUMOR LYSIS SYNDROME: ICD-10-CM

## 2025-04-28 RX ORDER — ALLOPURINOL 200 MG/1
200 TABLET ORAL DAILY
Qty: 30 TABLET | Refills: 2 | Status: SHIPPED | OUTPATIENT
Start: 2025-04-28

## 2025-04-28 RX ORDER — ALLOPURINOL 100 MG/1
200 TABLET ORAL DAILY
Qty: 60 TABLET | Refills: 2 | OUTPATIENT
Start: 2025-04-28

## 2025-04-29 ENCOUNTER — APPOINTMENT (OUTPATIENT)
Dept: GENERAL RADIOLOGY | Age: 45
End: 2025-04-29
Attending: SURGERY
Payer: MEDICAID

## 2025-04-29 ENCOUNTER — HOSPITAL ENCOUNTER (OUTPATIENT)
Age: 45
Setting detail: OUTPATIENT SURGERY
Discharge: HOME | End: 2025-04-29
Attending: SURGERY | Admitting: SURGERY
Payer: MEDICAID

## 2025-04-29 ENCOUNTER — ANESTHESIA EVENT (OUTPATIENT)
Dept: OPERATING ROOM | Age: 45
End: 2025-04-29
Payer: MEDICAID

## 2025-04-29 ENCOUNTER — ANESTHESIA (OUTPATIENT)
Dept: OPERATING ROOM | Age: 45
End: 2025-04-29
Payer: MEDICAID

## 2025-04-29 VITALS
TEMPERATURE: 96.9 F | RESPIRATION RATE: 16 BRPM | HEART RATE: 99 BPM | SYSTOLIC BLOOD PRESSURE: 137 MMHG | WEIGHT: 230 LBS | DIASTOLIC BLOOD PRESSURE: 95 MMHG | BODY MASS INDEX: 39.27 KG/M2 | OXYGEN SATURATION: 99 % | HEIGHT: 64 IN

## 2025-04-29 PROCEDURE — 6360000002 HC RX W HCPCS

## 2025-04-29 PROCEDURE — 3700000001 HC ADD 15 MINUTES (ANESTHESIA): Performed by: SURGERY

## 2025-04-29 PROCEDURE — 71045 X-RAY EXAM CHEST 1 VIEW: CPT

## 2025-04-29 PROCEDURE — 7100000001 HC PACU RECOVERY - ADDTL 15 MIN: Performed by: SURGERY

## 2025-04-29 PROCEDURE — 3600000014 HC SURGERY LEVEL 4 ADDTL 15MIN: Performed by: SURGERY

## 2025-04-29 PROCEDURE — C1788 PORT, INDWELLING, IMP: HCPCS | Performed by: SURGERY

## 2025-04-29 PROCEDURE — 2709999900 HC NON-CHARGEABLE SUPPLY: Performed by: SURGERY

## 2025-04-29 PROCEDURE — 36561 INSERT TUNNELED CV CATH: CPT | Performed by: SURGERY

## 2025-04-29 PROCEDURE — 77001 FLUOROGUIDE FOR VEIN DEVICE: CPT | Performed by: SURGERY

## 2025-04-29 PROCEDURE — 3600000004 HC SURGERY LEVEL 4 BASE: Performed by: SURGERY

## 2025-04-29 PROCEDURE — 7100000010 HC PHASE II RECOVERY - FIRST 15 MIN: Performed by: SURGERY

## 2025-04-29 PROCEDURE — 2500000003 HC RX 250 WO HCPCS: Performed by: SURGERY

## 2025-04-29 PROCEDURE — 2580000003 HC RX 258: Performed by: SURGERY

## 2025-04-29 PROCEDURE — 2580000003 HC RX 258: Performed by: ANESTHESIOLOGY

## 2025-04-29 PROCEDURE — 76937 US GUIDE VASCULAR ACCESS: CPT | Performed by: SURGERY

## 2025-04-29 PROCEDURE — 7100000011 HC PHASE II RECOVERY - ADDTL 15 MIN: Performed by: SURGERY

## 2025-04-29 PROCEDURE — 3700000000 HC ANESTHESIA ATTENDED CARE: Performed by: SURGERY

## 2025-04-29 PROCEDURE — 76999 ECHO EXAMINATION PROCEDURE: CPT | Performed by: SURGERY

## 2025-04-29 PROCEDURE — 6360000002 HC RX W HCPCS: Performed by: SURGERY

## 2025-04-29 PROCEDURE — 6360000002 HC RX W HCPCS: Performed by: ANESTHESIOLOGY

## 2025-04-29 PROCEDURE — 7100000000 HC PACU RECOVERY - FIRST 15 MIN: Performed by: SURGERY

## 2025-04-29 DEVICE — PORT INFUS PLAS SGL LUMN W/ 9.6FR SIL CATH AIRGUARD VLV: Type: IMPLANTABLE DEVICE | Site: CHEST | Status: FUNCTIONAL

## 2025-04-29 RX ORDER — SODIUM CHLORIDE, SODIUM LACTATE, POTASSIUM CHLORIDE, CALCIUM CHLORIDE 600; 310; 30; 20 MG/100ML; MG/100ML; MG/100ML; MG/100ML
INJECTION, SOLUTION INTRAVENOUS CONTINUOUS
Status: DISCONTINUED | OUTPATIENT
Start: 2025-04-29 | End: 2025-04-29 | Stop reason: HOSPADM

## 2025-04-29 RX ORDER — NALOXONE HYDROCHLORIDE 0.4 MG/ML
INJECTION, SOLUTION INTRAMUSCULAR; INTRAVENOUS; SUBCUTANEOUS PRN
Status: DISCONTINUED | OUTPATIENT
Start: 2025-04-29 | End: 2025-04-29 | Stop reason: HOSPADM

## 2025-04-29 RX ORDER — ONDANSETRON 2 MG/ML
4 INJECTION INTRAMUSCULAR; INTRAVENOUS
Status: DISCONTINUED | OUTPATIENT
Start: 2025-04-29 | End: 2025-04-29 | Stop reason: HOSPADM

## 2025-04-29 RX ORDER — LIDOCAINE HYDROCHLORIDE 10 MG/ML
INJECTION, SOLUTION INFILTRATION; PERINEURAL
Status: DISCONTINUED | OUTPATIENT
Start: 2025-04-29 | End: 2025-04-29 | Stop reason: SDUPTHER

## 2025-04-29 RX ORDER — SODIUM CHLORIDE 9 MG/ML
INJECTION, SOLUTION INTRAVENOUS PRN
Status: DISCONTINUED | OUTPATIENT
Start: 2025-04-29 | End: 2025-04-29 | Stop reason: HOSPADM

## 2025-04-29 RX ORDER — DEXAMETHASONE SODIUM PHOSPHATE 4 MG/ML
4 INJECTION, SOLUTION INTRA-ARTICULAR; INTRALESIONAL; INTRAMUSCULAR; INTRAVENOUS; SOFT TISSUE ONCE
Status: COMPLETED | OUTPATIENT
Start: 2025-04-29 | End: 2025-04-29

## 2025-04-29 RX ORDER — ONDANSETRON 2 MG/ML
INJECTION INTRAMUSCULAR; INTRAVENOUS
Status: DISCONTINUED | OUTPATIENT
Start: 2025-04-29 | End: 2025-04-29 | Stop reason: SDUPTHER

## 2025-04-29 RX ORDER — SODIUM CHLORIDE 0.9 % (FLUSH) 0.9 %
5-40 SYRINGE (ML) INJECTION PRN
Status: DISCONTINUED | OUTPATIENT
Start: 2025-04-29 | End: 2025-04-29 | Stop reason: HOSPADM

## 2025-04-29 RX ORDER — SODIUM CHLORIDE 0.9 % (FLUSH) 0.9 %
5-40 SYRINGE (ML) INJECTION EVERY 12 HOURS SCHEDULED
Status: DISCONTINUED | OUTPATIENT
Start: 2025-04-29 | End: 2025-04-29 | Stop reason: HOSPADM

## 2025-04-29 RX ORDER — HYDROMORPHONE HYDROCHLORIDE 1 MG/ML
0.25 INJECTION, SOLUTION INTRAMUSCULAR; INTRAVENOUS; SUBCUTANEOUS EVERY 5 MIN PRN
Status: DISCONTINUED | OUTPATIENT
Start: 2025-04-29 | End: 2025-04-29 | Stop reason: HOSPADM

## 2025-04-29 RX ORDER — DEXAMETHASONE SODIUM PHOSPHATE 10 MG/ML
INJECTION, SOLUTION INTRAMUSCULAR; INTRAVENOUS
Status: DISCONTINUED | OUTPATIENT
Start: 2025-04-29 | End: 2025-04-29 | Stop reason: SDUPTHER

## 2025-04-29 RX ORDER — MIDAZOLAM HYDROCHLORIDE 1 MG/ML
INJECTION, SOLUTION INTRAMUSCULAR; INTRAVENOUS
Status: DISCONTINUED | OUTPATIENT
Start: 2025-04-29 | End: 2025-04-29 | Stop reason: SDUPTHER

## 2025-04-29 RX ORDER — HEPARIN SODIUM,PORCINE/PF 10 UNIT/ML
SYRINGE (ML) INTRAVENOUS PRN
Status: DISCONTINUED | OUTPATIENT
Start: 2025-04-29 | End: 2025-04-29 | Stop reason: ALTCHOICE

## 2025-04-29 RX ORDER — HYDROMORPHONE HYDROCHLORIDE 1 MG/ML
0.5 INJECTION, SOLUTION INTRAMUSCULAR; INTRAVENOUS; SUBCUTANEOUS EVERY 5 MIN PRN
Status: DISCONTINUED | OUTPATIENT
Start: 2025-04-29 | End: 2025-04-29 | Stop reason: HOSPADM

## 2025-04-29 RX ORDER — PROPOFOL 10 MG/ML
INJECTION, EMULSION INTRAVENOUS
Status: DISCONTINUED | OUTPATIENT
Start: 2025-04-29 | End: 2025-04-29 | Stop reason: SDUPTHER

## 2025-04-29 RX ORDER — FENTANYL CITRATE 50 UG/ML
INJECTION, SOLUTION INTRAMUSCULAR; INTRAVENOUS
Status: DISCONTINUED | OUTPATIENT
Start: 2025-04-29 | End: 2025-04-29 | Stop reason: SDUPTHER

## 2025-04-29 RX ORDER — BUPIVACAINE HYDROCHLORIDE 2.5 MG/ML
INJECTION, SOLUTION INFILTRATION; PERINEURAL PRN
Status: DISCONTINUED | OUTPATIENT
Start: 2025-04-29 | End: 2025-04-29 | Stop reason: ALTCHOICE

## 2025-04-29 RX ADMIN — SODIUM CHLORIDE, PRESERVATIVE FREE 20 MG: 5 INJECTION INTRAVENOUS at 07:01

## 2025-04-29 RX ADMIN — SODIUM CHLORIDE, SODIUM LACTATE, POTASSIUM CHLORIDE, AND CALCIUM CHLORIDE: .6; .31; .03; .02 INJECTION, SOLUTION INTRAVENOUS at 07:14

## 2025-04-29 RX ADMIN — LIDOCAINE HYDROCHLORIDE 50 MG: 10 INJECTION, SOLUTION INFILTRATION; PERINEURAL at 07:59

## 2025-04-29 RX ADMIN — PROPOFOL 50 MG: 10 INJECTION, EMULSION INTRAVENOUS at 08:20

## 2025-04-29 RX ADMIN — DEXAMETHASONE SODIUM PHOSPHATE 10 MG: 10 INJECTION, SOLUTION INTRAMUSCULAR; INTRAVENOUS at 08:08

## 2025-04-29 RX ADMIN — DEXAMETHASONE SODIUM PHOSPHATE 4 MG: 4 INJECTION INTRA-ARTICULAR; INTRALESIONAL; INTRAMUSCULAR; INTRAVENOUS; SOFT TISSUE at 07:00

## 2025-04-29 RX ADMIN — SODIUM CHLORIDE, SODIUM LACTATE, POTASSIUM CHLORIDE, AND CALCIUM CHLORIDE: .6; .31; .03; .02 INJECTION, SOLUTION INTRAVENOUS at 06:58

## 2025-04-29 RX ADMIN — MIDAZOLAM 2 MG: 1 INJECTION INTRAMUSCULAR; INTRAVENOUS at 07:51

## 2025-04-29 RX ADMIN — PROPOFOL 150 MG: 10 INJECTION, EMULSION INTRAVENOUS at 07:59

## 2025-04-29 RX ADMIN — FENTANYL CITRATE 25 MCG: 0.05 INJECTION, SOLUTION INTRAMUSCULAR; INTRAVENOUS at 07:59

## 2025-04-29 RX ADMIN — FENTANYL CITRATE 25 MCG: 0.05 INJECTION, SOLUTION INTRAMUSCULAR; INTRAVENOUS at 08:14

## 2025-04-29 RX ADMIN — ONDANSETRON 4 MG: 2 INJECTION INTRAMUSCULAR; INTRAVENOUS at 08:30

## 2025-04-29 RX ADMIN — FENTANYL CITRATE 25 MCG: 0.05 INJECTION, SOLUTION INTRAMUSCULAR; INTRAVENOUS at 08:08

## 2025-04-29 RX ADMIN — FENTANYL CITRATE 25 MCG: 0.05 INJECTION, SOLUTION INTRAMUSCULAR; INTRAVENOUS at 08:20

## 2025-04-29 ASSESSMENT — PAIN - FUNCTIONAL ASSESSMENT
PAIN_FUNCTIONAL_ASSESSMENT: 0-10
PAIN_FUNCTIONAL_ASSESSMENT: NONE - DENIES PAIN
PAIN_FUNCTIONAL_ASSESSMENT: 0-10

## 2025-04-29 ASSESSMENT — LIFESTYLE VARIABLES: SMOKING_STATUS: 1

## 2025-04-29 ASSESSMENT — PAIN DESCRIPTION - DESCRIPTORS: DESCRIPTORS: ACHING

## 2025-04-29 NOTE — ANESTHESIA POSTPROCEDURE EVALUATION
Department of Anesthesiology  Postprocedure Note    Patient: Elvia Lynch  MRN: [id number]  YOB: 1980  Date of evaluation: 4/29/2025    Procedure Summary       Date: 04/29/25 Room / Location: [address]    Anesthesia Start: 0754 Anesthesia Stop: 0853    Procedure: INSERTION OF RIGHT INTERNAL JUGULAR CENTRAL VENOUS CATHETER TUNNELED WITH SUBCUTANEOUS PORT WITH THE USE OF ULTRASOUND & FLUOROSCOPY (Chest) Diagnosis:       Diffuse large B-cell lymphoma of intra-abdominal lymph nodes (HCC)      (Diffuse large B-cell lymphoma of intra-abdominal lymph nodes (HCC) [C83.33])    Surgeons: Spencer Spencer MD Responsible Provider: Polly Lance APRN - CRNA    Anesthesia Type: general, TIVA ASA Status: 3            Anesthesia Type: No value filed.    Hailee Phase I: Hailee Score: 10    Hailee Phase II:      Anesthesia Post Evaluation    Patient location during evaluation: PACU  Patient participation: waiting for patient participation  Level of consciousness: sleepy but conscious  Airway patency: patent  Nausea & Vomiting: no nausea  Cardiovascular status: hemodynamically stable  Respiratory status: acceptable and oral airway  Hydration status: stable  Comments: /61   Pulse 56   Temp 98 °F (36.7 °C) (Temporal) Comment: Simultaneous filing. User may not have seen previous data.  Resp 12   Ht 1.626 m (5' 4\")   Wt 104.3 kg (230 lb)   SpO2 96%   BMI 39.48 kg/m²     Pain management: adequate        No notable events documented.

## 2025-04-29 NOTE — H&P
Update History & Physical         [place] [facility] SPECIALTY PHYSICIAN CARE  [facility] GENERAL SURGERY  [address]  [facility] [id number] [phone]     Patient: Elvia Lynch   YOB: 1980   Date: 2025         History of Present Illness  Chief Complaint   Patient presents with    New Patient     B cell Lymphoma       This is a 44 y.o. female presents today complaining of B-cell lymphoma.  She is in need of an Jmdlse-e-Rjqk for chemotherapy.    I have reviewed the patient's chart including past visits, office notes, hospital reports, procedures, tests, labs, medications, and other reports.     Past Medical History:   Diagnosis Date    Allergies     Anxiety     Cancer (HCC)     Hypertension       Past Surgical History:   Procedure Laterality Date    APPENDECTOMY       SECTION       SECTION       SECTION      CHOLECYSTECTOMY      HYSTERECTOMY, TOTAL ABDOMINAL (CERVIX REMOVED)        Social History     Socioeconomic History    Marital status:    Tobacco Use    Smoking status: Every Day     Current packs/day: 1.00     Average packs/day: 1 pack/day for 29.3 years (29.3 ttl pk-yrs)     Types: Cigarettes     Start date:      Passive exposure: Past    Smokeless tobacco: Never   Vaping Use    Vaping status: Never Used   Substance and Sexual Activity    Alcohol use: Never    Drug use: Never    Sexual activity: Not Currently     Social Drivers of Health      Received from [facility]    Family and Community Support   Intimate Partner Violence: Low Risk  (4/3/2025)    Received from [facility] Historical Interpersonal Safety     Does anyone neglect, hurt, or threaten the patient?: No    Received from [facility]    Housing Stability        Tobacco Use      Smoking status: Every Day        Packs/day: 1.00        Years: 1 pack/day for 29.3 years (29.3 ttl pk-yrs)        Types: Cigarettes

## 2025-04-29 NOTE — OP NOTE
[facility] General Surgery Operative Note    Patient ID: Elvia Lynch  44 y.o.  female  YOB: 1980        NAME OF SURGEON: Spencer Spencer MD     DATE OF SERVICE: 4/29/2025    PREOPERATIVE DIAGNOSIS  B-cell lymphoma  In need of an Nwtwdk-x-Wtnd    POSTOPERATIVE DIAGNOSIS  Same    PROCEDURE  Placement of right internal jugular central venous catheter tunneled with subcutaneous port with the use of ultrasound and fluoroscopy    SURGEON  Spencer Spencer MD       SPECIMEN:  None    INDICATIONS  44-year-old female with B-cell lymphoma in need of chemotherapy    *Findings:  Infection Present At Time Of Surgery (PATOS) (choose all levels that have infection present):  No infection present  Other Findings: Normal anatomy      PROCEDURE  After informed consent was obtained the patient brought to the operating room placed in supine position on the operating table.  After adequate anesthesia a timeout was performed to adequately identify the patient's name number and procedure.  Her right chest and neck were sterilely prepped and draped as was the left chest.  She was then placed in Trendelenburg.    The right internal jugular vein was identified using ultrasound.  It was easily compressible.  We then used a needle to access the right internal jugular vein.  This was visualized with the ultrasound-the needle was entering into the vein.  There was good return of nonpulsatile dark blood.  We then passed the J-wire with ease and there was no ectopy.  The wire was then identified and confirmed to be in the right venous system with fluoroscopy.    Then on the right chest we injected 0.25% Marcaine and created an incision.  A subcutaneous pocket was created with electrocautery large enough to fit the port.  Then the catheter was attached to the subcutaneous tunneler and the catheter was tunneled from the right chest port incision up to the right neck venipuncture site.  Then the sheath and dilator was passed over the

## 2025-04-29 NOTE — OP NOTE
[facility] General Surgery    Patient ID: Elvia Lynch  44 y.o.  female  YOB: 1980      Brief Operative Report    Pre-operative Diagnosis: B-cell lymphoma    Post-operative Diagnosis: Same    Procedure: Ahwkmo-p-Ebdg placement    Surgeon:  Spencer Spencer MD    Anesthesia: General    Findings: Findings:  Infection Present At Time Of Surgery (PATOS) (choose all levels that have infection present):  No infection present  Other Findings: Normal anatomy    Estimated blood loss: 1 ml    Specimens: None    Complications:  none    Condition:  stable        See dictated operative report for full det

## 2025-04-29 NOTE — ANESTHESIA PRE PROCEDURE
Department of Anesthesiology  Preprocedure Note       Name:  Elvia Lynch   Age:  44 y.o.  :  1980                                          MRN:  [id number]         Date:  2025      Surgeon: Surgeon(s):  Spencer Spencer MD    Procedure: Procedure(s):  INSERTION OF RIGHT INTERNAL JUGULAR CENTRAL VENOUS CATHETER TUNNELED WITH SUBCUTANEOUS PORT WITH THE USE OF ULTRASOUND & FLUOROSCOPY    Medications prior to admission:   Prior to Admission medications    Medication Sig Start Date End Date Taking? Authorizing Provider   Allopurinol 200 MG TABS Take 200 mg by mouth daily 25   Gertrude Park MD   cetirizine (ZYRTEC) 10 MG tablet Take 1 tablet by mouth daily as needed 2/10/25   Daren Chandler MD   fluticasone (VERAMYST) 27.5 MCG/SPRAY nasal spray 2 sprays by Nasal route daily as needed    Daren Chandler MD   hydroCHLOROthiazide (HYDRODIURIL) 25 MG tablet Take 1 tablet by mouth daily 25   Daren Chandler MD   hydrOXYzine HCl (ATARAX) 10 MG tablet Take 1 tablet by mouth every 4 hours as needed 25   Daren Chandler MD   metoprolol tartrate (LOPRESSOR) 50 MG tablet Take 1 tablet by mouth 2 times daily 25   Daren Chandler MD   predniSONE (DELTASONE) 50 MG tablet Take 2 tablets daily x5 days with each treatment every 3 weeks x6 cycles 25   Gertrude Park MD   ondansetron (ZOFRAN) 4 MG tablet Take 1 tablet by mouth every 6 hours as needed for Nausea or Vomiting 25   Gertrude Park MD   promethazine (PHENERGAN) 25 MG tablet Take 0.5 tablets by mouth every 6 hours as needed for Nausea 25   Gertrude Park MD   OLANZapine (ZYPREXA) 10 MG tablet 1 tablet at night x4 days with each treatment every 3 weeks x6 cycles 25   Gertrude Park MD       Current medications:    Current Facility-Administered Medications   Medication Dose Route Frequency Provider Last Rate Last Admin   • lactated ringers infusion   IntraVENous

## 2025-05-01 NOTE — PROGRESS NOTES
MEDICAL ONCOLOGY PROGRESS NOTE     Patient Name: Elvia Lynch  MRN: [id number]  YOB: 1980  Date of evaluation: 5/2/2025        HISTORY OF PRESENT ILLNESS:  History of Present Illness  The patient is a 44-year-old female with a diagnosis of diffuse large B-cell lymphoma, germinal center phenotype, high grade, Ki-67, 80%, IPI 3, stage IV, diagnosed in 03/2025. She is here today for the initiation of chemotherapy with R-CHOP, marking her first cycle.    A PET scan and echocardiogram have been completed, and a port has been placed. Prednisone has been prescribed, which she has brought with her today. Additionally, Zofran has been provided as a precautionary measure against potential nausea and vomiting. Despite not experiencing any symptoms, she expresses fear about her condition. She is currently not employed and seeks advice on whether she should remain at home during her treatment period. She is very anxious today and scared.  She tells me that she remains asymptomatic.        Diagnosis  Diffuse large B-cell lymphoma, liver, March 2025  Germinal center type, High grade  Ki-67 of 80%, Non double expressor  FISH: BCL-6 Rearrangement, Negative rearrangement BCL2 and MYC   Stage IV  IPI III    Treatment Summary  5/2/2025 Initiate R-CHOP with Rituxan, Cyclophosphamide, Adriamycin, Vincristine, Prednisone + GCSF every 3 weeks x6 cycles, per recommendations from Dr Bryan Merritt/[facility], treatment consent signed 4/11/25    Cancer History  Elvia Lynch was first seen by me on 4/11/2025.  She was referred by Dr. Bryan Merritt, [facility] for a diagnosis of diffuse large B-cell lymphoma, high-grade with rearrangement cell 6 send no rearrangement of c-Myc or Bcl-2.  Patient stage IV disease.  Patient was recommended 6 cycles of R-CHOP.  2/17/25 CT abd/pelvis ([facility]): Findings are compatible with subtle diffuse hepatic metastatic disease, likely from a neoplastic process within the left epigastrum.\"

## 2025-05-02 ENCOUNTER — OFFICE VISIT (OUTPATIENT)
Dept: HEMATOLOGY | Age: 45
End: 2025-05-02
Payer: MEDICAID

## 2025-05-02 ENCOUNTER — HOSPITAL ENCOUNTER (OUTPATIENT)
Dept: INFUSION THERAPY | Age: 45
Discharge: HOME | End: 2025-05-02
Payer: MEDICAID

## 2025-05-02 VITALS
RESPIRATION RATE: 18 BRPM | DIASTOLIC BLOOD PRESSURE: 62 MMHG | HEART RATE: 70 BPM | OXYGEN SATURATION: 99 % | TEMPERATURE: 97.7 F | SYSTOLIC BLOOD PRESSURE: 110 MMHG

## 2025-05-02 VITALS
WEIGHT: 232.9 LBS | HEART RATE: 70 BPM | SYSTOLIC BLOOD PRESSURE: 115 MMHG | OXYGEN SATURATION: 99 % | DIASTOLIC BLOOD PRESSURE: 47 MMHG | HEIGHT: 64 IN | BODY MASS INDEX: 39.76 KG/M2 | TEMPERATURE: 98.1 F | RESPIRATION RATE: 18 BRPM

## 2025-05-02 DIAGNOSIS — C83.33 DIFFUSE LARGE B-CELL LYMPHOMA OF INTRA-ABDOMINAL LYMPH NODES (HCC): Primary | ICD-10-CM

## 2025-05-02 DIAGNOSIS — Z51.11 CHEMOTHERAPY MANAGEMENT, ENCOUNTER FOR: ICD-10-CM

## 2025-05-02 DIAGNOSIS — C80.1 CANCER (HCC): Primary | ICD-10-CM

## 2025-05-02 DIAGNOSIS — C83.38 DIFFUSE LARGE B-CELL LYMPHOMA OF LYMPH NODES OF MULTIPLE REGIONS (HCC): Primary | ICD-10-CM

## 2025-05-02 DIAGNOSIS — T45.1X5A ADVERSE EFFECT OF CHEMOTHERAPY, INITIAL ENCOUNTER: ICD-10-CM

## 2025-05-02 DIAGNOSIS — Z51.12 ENCOUNTER FOR ANTINEOPLASTIC IMMUNOTHERAPY: ICD-10-CM

## 2025-05-02 DIAGNOSIS — Z91.89 AT HIGH RISK OF TUMOR LYSIS SYNDROME: ICD-10-CM

## 2025-05-02 DIAGNOSIS — F41.1 ANXIETY ASSOCIATED WITH CANCER DIAGNOSIS (HCC): ICD-10-CM

## 2025-05-02 DIAGNOSIS — C80.1 ANXIETY ASSOCIATED WITH CANCER DIAGNOSIS (HCC): ICD-10-CM

## 2025-05-02 DIAGNOSIS — C83.33 DIFFUSE LARGE B-CELL LYMPHOMA OF INTRA-ABDOMINAL LYMPH NODES (HCC): ICD-10-CM

## 2025-05-02 DIAGNOSIS — Z71.89 CARE PLAN DISCUSSED WITH PATIENT: ICD-10-CM

## 2025-05-02 LAB
ALBUMIN SERPL-MCNC: 4 G/DL (ref 3.5–5.2)
ALP SERPL-CCNC: 122 U/L (ref 35–104)
ALT SERPL-CCNC: 10 U/L (ref 5–33)
ANION GAP SERPL CALCULATED.3IONS-SCNC: 11 MMOL/L (ref 7–19)
AST SERPL-CCNC: 16 U/L (ref 5–32)
BASOPHILS # BLD: 0.04 K/UL (ref 0–0.2)
BASOPHILS NFR BLD: 0.3 % (ref 0–1)
BILIRUB SERPL-MCNC: 0.3 MG/DL (ref 0–1.2)
BUN SERPL-MCNC: 8 MG/DL (ref 6–20)
CALCIUM SERPL-MCNC: 9 MG/DL (ref 8.6–10)
CHLORIDE SERPL-SCNC: 103 MMOL/L (ref 98–107)
CO2 SERPL-SCNC: 26 MMOL/L (ref 22–29)
CREAT SERPL-MCNC: 0.6 MG/DL (ref 0.5–0.9)
EOSINOPHIL # BLD: 0.09 K/UL (ref 0–0.6)
EOSINOPHIL NFR BLD: 0.8 % (ref 0–5)
ERYTHROCYTE [DISTWIDTH] IN BLOOD BY AUTOMATED COUNT: 12.9 % (ref 11.5–14.5)
GLUCOSE SERPL-MCNC: 156 MG/DL (ref 70–99)
HBV SURFACE AB SERPL IA-ACNC: <3.5 M[IU]/ML
HBV SURFACE AG SERPL QL IA: NORMAL
HCT VFR BLD AUTO: 39.7 % (ref 37–47)
HGB BLD-MCNC: 13.7 G/DL (ref 12–16)
LYMPHOCYTES # BLD: 2.52 K/UL (ref 1.1–4.5)
LYMPHOCYTES NFR BLD: 21.3 % (ref 20–40)
MCH RBC QN AUTO: 32.2 PG (ref 27–31)
MCHC RBC AUTO-ENTMCNC: 34.5 G/DL (ref 33–37)
MCV RBC AUTO: 93.4 FL (ref 81–99)
MONOCYTES # BLD: 0.7 K/UL (ref 0–0.9)
MONOCYTES NFR BLD: 5.9 % (ref 1–10)
NEUTROPHILS # BLD: 8.41 K/UL (ref 1.5–7.5)
NEUTS SEG NFR BLD: 71.2 % (ref 50–65)
PLATELET # BLD AUTO: 349 K/UL (ref 130–400)
PMV BLD AUTO: 9.5 FL (ref 9.4–12.3)
POTASSIUM SERPL-SCNC: 3.5 MMOL/L (ref 3.5–5.1)
PROT SERPL-MCNC: 7.2 G/DL (ref 6.4–8.3)
RBC # BLD AUTO: 4.25 M/UL (ref 4.2–5.4)
SODIUM SERPL-SCNC: 140 MMOL/L (ref 136–145)
WBC # BLD AUTO: 11.82 K/UL (ref 4.8–10.8)

## 2025-05-02 PROCEDURE — 6360000002 HC RX W HCPCS: Performed by: INTERNAL MEDICINE

## 2025-05-02 PROCEDURE — 96367 TX/PROPH/DG ADDL SEQ IV INF: CPT

## 2025-05-02 PROCEDURE — 6370000000 HC RX 637 (ALT 250 FOR IP): Performed by: INTERNAL MEDICINE

## 2025-05-02 PROCEDURE — 96411 CHEMO IV PUSH ADDL DRUG: CPT

## 2025-05-02 PROCEDURE — 96417 CHEMO IV INFUS EACH ADDL SEQ: CPT

## 2025-05-02 PROCEDURE — 2580000003 HC RX 258: Performed by: INTERNAL MEDICINE

## 2025-05-02 PROCEDURE — 80053 COMPREHEN METABOLIC PANEL: CPT

## 2025-05-02 PROCEDURE — 2500000003 HC RX 250 WO HCPCS: Performed by: INTERNAL MEDICINE

## 2025-05-02 PROCEDURE — 6360000002 HC RX W HCPCS

## 2025-05-02 PROCEDURE — 96413 CHEMO IV INFUSION 1 HR: CPT

## 2025-05-02 PROCEDURE — 85025 COMPLETE CBC W/AUTO DIFF WBC: CPT

## 2025-05-02 PROCEDURE — 96375 TX/PRO/DX INJ NEW DRUG ADDON: CPT

## 2025-05-02 PROCEDURE — 36415 COLL VENOUS BLD VENIPUNCTURE: CPT

## 2025-05-02 PROCEDURE — 96415 CHEMO IV INFUSION ADDL HR: CPT

## 2025-05-02 RX ORDER — DOXORUBICIN HYDROCHLORIDE 2 MG/ML
50 INJECTION, SOLUTION INTRAVENOUS ONCE
Status: CANCELLED | OUTPATIENT
Start: 2025-05-02 | End: 2025-05-02

## 2025-05-02 RX ORDER — HEPARIN SODIUM (PORCINE) LOCK FLUSH IV SOLN 100 UNIT/ML 100 UNIT/ML
500 SOLUTION INTRAVENOUS PRN
Status: CANCELLED | OUTPATIENT
Start: 2025-05-02

## 2025-05-02 RX ORDER — ACETAMINOPHEN 325 MG/1
1000 TABLET ORAL ONCE
Status: CANCELLED | OUTPATIENT
Start: 2025-05-02 | End: 2025-05-02

## 2025-05-02 RX ORDER — EPINEPHRINE 1 MG/ML
0.3 INJECTION, SOLUTION, CONCENTRATE INTRAVENOUS PRN
OUTPATIENT
Start: 2025-05-02

## 2025-05-02 RX ORDER — SODIUM CHLORIDE 0.9 % (FLUSH) 0.9 %
5-40 SYRINGE (ML) INJECTION PRN
Status: CANCELLED | OUTPATIENT
Start: 2025-05-02

## 2025-05-02 RX ORDER — MEPERIDINE HYDROCHLORIDE 50 MG/ML
12.5 INJECTION INTRAMUSCULAR; INTRAVENOUS; SUBCUTANEOUS PRN
OUTPATIENT
Start: 2025-05-02

## 2025-05-02 RX ORDER — PROCHLORPERAZINE EDISYLATE 5 MG/ML
5 INJECTION INTRAMUSCULAR; INTRAVENOUS
OUTPATIENT
Start: 2025-05-02

## 2025-05-02 RX ORDER — ACETAMINOPHEN 325 MG/1
650 TABLET ORAL
OUTPATIENT
Start: 2025-05-02

## 2025-05-02 RX ORDER — ACETAMINOPHEN 500 MG
1000 TABLET ORAL ONCE
Status: COMPLETED | OUTPATIENT
Start: 2025-05-02 | End: 2025-05-02

## 2025-05-02 RX ORDER — PALONOSETRON 0.05 MG/ML
0.25 INJECTION, SOLUTION INTRAVENOUS ONCE
Status: COMPLETED | OUTPATIENT
Start: 2025-05-02 | End: 2025-05-02

## 2025-05-02 RX ORDER — DEXAMETHASONE SODIUM PHOSPHATE 10 MG/ML
10 INJECTION, SOLUTION INTRAMUSCULAR; INTRAVENOUS
Status: DISCONTINUED | OUTPATIENT
Start: 2025-05-02 | End: 2025-05-04 | Stop reason: HOSPADM

## 2025-05-02 RX ORDER — DOXORUBICIN HYDROCHLORIDE 2 MG/ML
50 INJECTION, SOLUTION INTRAVENOUS ONCE
Status: DISCONTINUED | OUTPATIENT
Start: 2025-05-02 | End: 2025-05-02

## 2025-05-02 RX ORDER — SODIUM CHLORIDE 9 MG/ML
5-250 INJECTION, SOLUTION INTRAVENOUS PRN
OUTPATIENT
Start: 2025-05-02

## 2025-05-02 RX ORDER — DIPHENHYDRAMINE HYDROCHLORIDE 50 MG/ML
50 INJECTION, SOLUTION INTRAMUSCULAR; INTRAVENOUS ONCE
Status: CANCELLED | OUTPATIENT
Start: 2025-05-02 | End: 2025-05-02

## 2025-05-02 RX ORDER — PREDNISONE 50 MG/1
TABLET ORAL
Qty: 50 TABLET | Refills: 0 | Status: SHIPPED | OUTPATIENT
Start: 2025-05-02

## 2025-05-02 RX ORDER — ALBUTEROL SULFATE 90 UG/1
4 INHALANT RESPIRATORY (INHALATION) PRN
OUTPATIENT
Start: 2025-05-02

## 2025-05-02 RX ORDER — DIPHENHYDRAMINE HYDROCHLORIDE 50 MG/ML
25 INJECTION, SOLUTION INTRAMUSCULAR; INTRAVENOUS
Status: DISCONTINUED | OUTPATIENT
Start: 2025-05-02 | End: 2025-05-04 | Stop reason: HOSPADM

## 2025-05-02 RX ORDER — HEPARIN 100 UNIT/ML
500 SYRINGE INTRAVENOUS PRN
Status: DISCONTINUED | OUTPATIENT
Start: 2025-05-02 | End: 2025-05-03 | Stop reason: HOSPADM

## 2025-05-02 RX ORDER — LIDOCAINE AND PRILOCAINE 25; 25 MG/G; MG/G
CREAM TOPICAL
Qty: 1 EACH | Refills: 3 | Status: SHIPPED | OUTPATIENT
Start: 2025-05-02

## 2025-05-02 RX ORDER — SODIUM CHLORIDE 0.9 % (FLUSH) 0.9 %
5-40 SYRINGE (ML) INJECTION PRN
Status: DISCONTINUED | OUTPATIENT
Start: 2025-05-02 | End: 2025-05-03 | Stop reason: HOSPADM

## 2025-05-02 RX ORDER — HYDROCORTISONE SODIUM SUCCINATE 100 MG/2ML
100 INJECTION INTRAMUSCULAR; INTRAVENOUS
Status: CANCELLED | OUTPATIENT
Start: 2025-05-02

## 2025-05-02 RX ORDER — FAMOTIDINE 10 MG/ML
20 INJECTION, SOLUTION INTRAVENOUS
Status: CANCELLED | OUTPATIENT
Start: 2025-05-02

## 2025-05-02 RX ORDER — SODIUM CHLORIDE 9 MG/ML
INJECTION, SOLUTION INTRAVENOUS CONTINUOUS
OUTPATIENT
Start: 2025-05-02

## 2025-05-02 RX ORDER — DIPHENHYDRAMINE HYDROCHLORIDE 50 MG/ML
50 INJECTION, SOLUTION INTRAMUSCULAR; INTRAVENOUS
Status: CANCELLED | OUTPATIENT
Start: 2025-05-02

## 2025-05-02 RX ORDER — FAMOTIDINE 10 MG/ML
20 INJECTION, SOLUTION INTRAVENOUS
Status: COMPLETED | OUTPATIENT
Start: 2025-05-02 | End: 2025-05-02

## 2025-05-02 RX ORDER — PALONOSETRON 0.05 MG/ML
0.25 INJECTION, SOLUTION INTRAVENOUS ONCE
Status: CANCELLED | OUTPATIENT
Start: 2025-05-02 | End: 2025-05-02

## 2025-05-02 RX ORDER — ONDANSETRON 2 MG/ML
8 INJECTION INTRAMUSCULAR; INTRAVENOUS
OUTPATIENT
Start: 2025-05-02

## 2025-05-02 RX ORDER — DIPHENHYDRAMINE HYDROCHLORIDE 50 MG/ML
50 INJECTION, SOLUTION INTRAMUSCULAR; INTRAVENOUS ONCE
Status: COMPLETED | OUTPATIENT
Start: 2025-05-02 | End: 2025-05-02

## 2025-05-02 RX ORDER — HYDROCORTISONE SODIUM SUCCINATE 100 MG/2ML
100 INJECTION INTRAMUSCULAR; INTRAVENOUS
Status: COMPLETED | OUTPATIENT
Start: 2025-05-02 | End: 2025-05-02

## 2025-05-02 RX ORDER — FAMOTIDINE 10 MG/ML
INJECTION, SOLUTION INTRAVENOUS
Status: COMPLETED
Start: 2025-05-02 | End: 2025-05-02

## 2025-05-02 RX ADMIN — SODIUM CHLORIDE 800 MG: 9 INJECTION, SOLUTION INTRAVENOUS at 11:10

## 2025-05-02 RX ADMIN — HEPARIN 500 UNITS: 100 SYRINGE at 17:38

## 2025-05-02 RX ADMIN — PALONOSETRON 0.25 MG: 0.05 INJECTION, SOLUTION INTRAVENOUS at 10:15

## 2025-05-02 RX ADMIN — SODIUM CHLORIDE, PRESERVATIVE FREE 10 ML: 5 INJECTION INTRAVENOUS at 17:38

## 2025-05-02 RX ADMIN — DIPHENHYDRAMINE HYDROCHLORIDE 50 MG: 50 INJECTION INTRAMUSCULAR; INTRAVENOUS at 10:21

## 2025-05-02 RX ADMIN — VINCRISTINE SULFATE 2 MG: 1 INJECTION, SOLUTION INTRAVENOUS at 16:44

## 2025-05-02 RX ADMIN — ACETAMINOPHEN 1000 MG: 500 TABLET, FILM COATED ORAL at 10:10

## 2025-05-02 RX ADMIN — CYCLOPHOSPHAMIDE 1660 MG: 2 INJECTION, POWDER, FOR SOLUTION INTRAVENOUS; ORAL at 16:59

## 2025-05-02 RX ADMIN — HYDROCORTISONE SODIUM SUCCINATE 100 MG: 100 INJECTION, POWDER, FOR SOLUTION INTRAMUSCULAR; INTRAVENOUS at 13:13

## 2025-05-02 RX ADMIN — FAMOTIDINE 20 MG: 10 INJECTION, SOLUTION INTRAVENOUS at 12:56

## 2025-05-02 RX ADMIN — SODIUM CHLORIDE 150 MG: 0.9 INJECTION, SOLUTION INTRAVENOUS at 10:36

## 2025-05-02 RX ADMIN — DOXORUBICIN HYDROCHLORIDE 110 MG: 2 INJECTION, SOLUTION INTRAVENOUS at 16:28

## 2025-05-02 NOTE — PROGRESS NOTES
Spiritual Health History and Assessment/Progress Note  [facility]    Initial Encounter,  ,  ,      Name: Elvia Lynch MRN: [id number]    Age: 44 y.o.     Sex: female   Language: English   Worship: Unknown   <principal problem not specified>     Date: 5/2/2025            Total Time Calculated: 14 min              Spiritual Assessment began in [facility]        Referral/Consult From: Nurse   Encounter Overview/Reason: Initial Encounter  Service Provided For: Patient and family together (Mother)    Lorie, Belief, Meaning:   Patient is connected with a lorie tradition or spiritual practice: pt stated - \"Judaism,\" in reference to the Bible on the table - \"The g-d takes care of me,\" \"My [place].\"  Family/Friends Other: Mother did not add any to the pt's statement.      Importance and Influence:  Patient has spiritual/personal beliefs that influence decisions regarding their health: Pt expressed that she is being protected-thanks to her lorie.  Family/Friends Other: Quiet.    Community:  Patient feels well-supported. Support system includes: Spouse/Partner, Parent/s, Children, Lorie Community, and Extended family  Family/Friends feel well-supported. Support system includes: Children and Lorie Community    Assessment and Plan of Care:   Pt shows no evidence of spiritual concern/distress, may need follow up.  She sounded very strong in her declaration of lorie, and support from the Zoroastrianism and family.  Patient Interventions include: Facilitated expression of thoughts and feelings  Family/Friends Interventions include: Facilitated expression of thoughts and feelings    Patient Plan of Care: No future visits per patient/family request  Family/Friends Plan of Care: No family/friends present    Electronically signed by OLMAN Sheikh on 5/2/2025 at 12:36 PM

## 2025-05-02 NOTE — PROGRESS NOTES
In with patient for vitals and to increase Rituxan.   Infusion is currently running at 150 ml/hr.  Pt states her lip and face are tingling.   States her eyes and ears fell heavy.   Rituxan stopped at 1251.  NS increased from KVO to 200 ml/hr.    60, P 72, R 18, O2 sat 98%.  Pt denies other symptoms.   Pharmacist aware.   Pepcid 20 mg given IVP at 1256.  NS continues.  Pt denies additional symptoms.   See VSS flowsheet.  1301 Pt states is feeling better.  No tingling, states eyes and ears feels less heavy/full.   1313 Solucortef 100 mg IVP.   Pt states she is feeling better.   NS continues.  1329 restarted at 100 ml/hr.   Mother in room and bell in place. See flowsheet for additional vitals.

## 2025-05-02 NOTE — PROGRESS NOTES
Medications Ordered in Other Visits   Medication Dose Route Frequency Provider Last Rate Last Admin    dexAMETHasone (PF) (DECADRON) injection 10 mg  10 mg IntraVENous Once PRN Gertrude Park MD        cycloPHOSphamide (CYTOXAN) 1,660 mg in sodium chloride 0.9 % 250 mL chemo IVPB  750 mg/m2 (Treatment Plan Recorded) IntraVENous Once Gertrude Park MD        vinCRIStine (ONCOVIN) 2 mg in sodium chloride 0.9 % 50 mL chemo IVPB  2 mg IntraVENous Once Gertrude Park MD        sodium chloride flush 0.9 % injection 5-40 mL  5-40 mL IntraVENous PRN Gertrude Park MD        heparin (PF) 100 UNIT/ML injection 500 Units  500 Units IntraCATHeter PRN Gertrude Park MD        DOXOrubicin HCl (ADRIAMYCIN) 110 mg in sodium chloride 0.9 % 100 mL chemo IVPB  110 mg IntraVENous Once Gertrude Park MD              No chief complaint on file.       Visit Note:     ROSEMARIE Salazar completed “New to Treatment” visit. Patient is a 44 year old  female.  Patient denied any pain or negative side effects today.  Patient resides in her home with her spouse-Dylon and they just got custody of their four granddaughters ages 10, 8, 3, and 2.  Patients daughter-Anabell and her son Alphonse live in the home as well. Patients mother Raquel is with Patient at [facility].  RNAngela reported that Patient was tearful upon entering the clinic and stated that she did not want to do this. SW met with Patient to introduce self and explained role and source of support. SW provided and reviewed New to Treatment packet with the patient.  Patient reports that she has a good support system through her family, friends and [facility]. Patient is Pentacostal augie.  She has a strong augie in God.  Patient states that she has to do this (chemo) for her kids and grandkids.  Patient states that they depend on her.  Patient voices that she has anxiety and has for years.  She states that she was prescribed atarax

## 2025-05-04 LAB — HBV CORE AB SERPL QL IA: NEGATIVE

## 2025-05-06 ENCOUNTER — TELEPHONE (OUTPATIENT)
Dept: HEMATOLOGY | Age: 45
End: 2025-05-06

## 2025-05-06 DIAGNOSIS — R68.2 MOUTH DRYNESS: ICD-10-CM

## 2025-05-06 DIAGNOSIS — C83.38 DIFFUSE LARGE B-CELL LYMPHOMA OF LYMPH NODES OF MULTIPLE REGIONS (HCC): Primary | ICD-10-CM

## 2025-05-06 NOTE — TELEPHONE ENCOUNTER
I have returned phone call to patient regarding recommendations with Miracle Mouthwash due to mouth dryness. I have verified pharmacy with patient.

## 2025-05-06 NOTE — TELEPHONE ENCOUNTER
I have attempted to call patient back regarding 's recommendations of blurry vision, mouth dryness and dizzy.. I have requested for patient to contact our office.

## 2025-05-09 ENCOUNTER — TELEPHONE (OUTPATIENT)
Dept: HEMATOLOGY | Age: 45
End: 2025-05-09

## 2025-05-09 NOTE — TELEPHONE ENCOUNTER
Patient called and is still having complaints of light headedness and spells like she is going to pass out. Patient reports this is new since treatment and happens 2-3 times a day. Patient reports she has been checking her blood pressure as advised and is receiving normal readings. Patient reports she will just be sitting in chair and it happens. Electronically signed by Keena Sloan RN on 5/9/2025 at 8:53 AM

## 2025-05-21 NOTE — PROGRESS NOTES
MEDICAL ONCOLOGY PROGRESS NOTE     Patient Name: Elvia Lynch  MRN: [id number]  YOB: 1980  Date of evaluation: 5/23/2025    HISTORY OF PRESENT ILLNESS:  History of Present Illness  The patient is a 44-year-old female with a diagnosis of diffuse large B-cell lymphoma, germinal center phenotype, high grade, Ki-67, 80%, IPI 3, stage IV, diagnosed in 03/2025 with evidence of disease in the liver and spleen. She was informed about the initiation of chemotherapy with R-CHOP, which started on 05/02/2025. She experienced significant side effects related to the treatment.    She reports experiencing nausea, for which she took 2 doses of Zyprexa. She did not experience any episodes of diarrhea. She also experienced severe dry mouth and gum soreness, particularly in the area where she had a tooth extraction approximately a week prior to the initiation of chemotherapy. Additional symptoms included tingling sensations in her hands, leg pain, severe headaches, and a transient rash that appeared a week after her chemotherapy session. Over the past two weeks, she has been feeling better and has returned to her baseline health status. She also reported episodes of near syncope, which occurred while she was sitting and leaning back on her couch. Despite these episodes, her blood pressure remained stable, and her primary care physician confirmed that her blood sugar levels were within normal limits. She is currently on antihypertensive medication and continues to take allopurinol.    PAST SURGICAL HISTORY:  Dental extraction approximately a week prior to chemotherapy initiation.        Diagnosis  Diffuse large B-cell lymphoma, liver, March 2025  Germinal center type, High grade  Ki-67 of 80%, Non double expressor  FISH: BCL-6 Rearrangement, Negative rearrangement BCL2 and MYC   Stage IV  IPI III    Treatment Summary  5/2/2025 Initiate R-CHOP with Rituxan, Cyclophosphamide, Adriamycin, Vincristine, Prednisone + GCSF every 3

## 2025-05-23 ENCOUNTER — HOSPITAL ENCOUNTER (OUTPATIENT)
Dept: INFUSION THERAPY | Age: 45
Discharge: HOME | End: 2025-05-23
Payer: MEDICAID

## 2025-05-23 ENCOUNTER — OFFICE VISIT (OUTPATIENT)
Dept: HEMATOLOGY | Age: 45
End: 2025-05-23
Payer: MEDICAID

## 2025-05-23 ENCOUNTER — OFFICE VISIT (OUTPATIENT)
Dept: PALLATIVE CARE | Age: 45
End: 2025-05-23
Payer: MEDICAID

## 2025-05-23 VITALS
SYSTOLIC BLOOD PRESSURE: 118 MMHG | HEIGHT: 64 IN | DIASTOLIC BLOOD PRESSURE: 82 MMHG | OXYGEN SATURATION: 100 % | WEIGHT: 236 LBS | RESPIRATION RATE: 18 BRPM | TEMPERATURE: 97.4 F | BODY MASS INDEX: 40.29 KG/M2 | HEART RATE: 71 BPM

## 2025-05-23 VITALS
TEMPERATURE: 96.8 F | SYSTOLIC BLOOD PRESSURE: 118 MMHG | RESPIRATION RATE: 18 BRPM | OXYGEN SATURATION: 100 % | HEART RATE: 70 BPM | DIASTOLIC BLOOD PRESSURE: 65 MMHG

## 2025-05-23 VITALS — HEIGHT: 64 IN | BODY MASS INDEX: 40.51 KG/M2

## 2025-05-23 DIAGNOSIS — Z51.12 ENCOUNTER FOR ANTINEOPLASTIC IMMUNOTHERAPY: ICD-10-CM

## 2025-05-23 DIAGNOSIS — R11.2 CHEMOTHERAPY INDUCED NAUSEA AND VOMITING: ICD-10-CM

## 2025-05-23 DIAGNOSIS — C83.33 DIFFUSE LARGE B-CELL LYMPHOMA OF INTRA-ABDOMINAL LYMPH NODES (HCC): ICD-10-CM

## 2025-05-23 DIAGNOSIS — G89.3 CANCER RELATED PAIN: ICD-10-CM

## 2025-05-23 DIAGNOSIS — C83.38 DIFFUSE LARGE B-CELL LYMPHOMA OF LYMPH NODES OF MULTIPLE REGIONS (HCC): Primary | ICD-10-CM

## 2025-05-23 DIAGNOSIS — C83.38 DIFFUSE LARGE B-CELL LYMPHOMA OF LYMPH NODES OF MULTIPLE REGIONS (HCC): ICD-10-CM

## 2025-05-23 DIAGNOSIS — K12.32 DRUG-INDUCED MUCOSITIS: ICD-10-CM

## 2025-05-23 DIAGNOSIS — G89.3 CANCER ASSOCIATED PAIN: Primary | ICD-10-CM

## 2025-05-23 DIAGNOSIS — C83.33 DIFFUSE LARGE B-CELL LYMPHOMA OF INTRA-ABDOMINAL LYMPH NODES (HCC): Primary | ICD-10-CM

## 2025-05-23 DIAGNOSIS — D72.828 NEUTROPHILIC LEUKOCYTOSIS: ICD-10-CM

## 2025-05-23 DIAGNOSIS — Z51.11 CHEMOTHERAPY MANAGEMENT, ENCOUNTER FOR: ICD-10-CM

## 2025-05-23 DIAGNOSIS — Z71.89 CARE PLAN DISCUSSED WITH PATIENT: ICD-10-CM

## 2025-05-23 DIAGNOSIS — Z51.5 ENCOUNTER FOR PALLIATIVE CARE: ICD-10-CM

## 2025-05-23 DIAGNOSIS — T45.1X5D ADVERSE EFFECT OF CHEMOTHERAPY, SUBSEQUENT ENCOUNTER: ICD-10-CM

## 2025-05-23 DIAGNOSIS — T45.1X5A CHEMOTHERAPY INDUCED NAUSEA AND VOMITING: ICD-10-CM

## 2025-05-23 LAB
ALBUMIN SERPL-MCNC: 4.2 G/DL (ref 3.5–5.2)
ALP SERPL-CCNC: 122 U/L (ref 35–104)
ALT SERPL-CCNC: 15 U/L (ref 5–33)
ANION GAP SERPL CALCULATED.3IONS-SCNC: 11 MMOL/L (ref 7–19)
AST SERPL-CCNC: 17 U/L (ref 5–32)
BASOPHILS # BLD: 0.12 K/UL (ref 0–0.2)
BASOPHILS NFR BLD: 0.9 % (ref 0–1)
BILIRUB SERPL-MCNC: <0.2 MG/DL (ref 0–1.2)
BUN SERPL-MCNC: 13 MG/DL (ref 6–20)
CALCIUM SERPL-MCNC: 9.1 MG/DL (ref 8.6–10)
CHLORIDE SERPL-SCNC: 103 MMOL/L (ref 98–107)
CO2 SERPL-SCNC: 25 MMOL/L (ref 22–29)
CREAT SERPL-MCNC: 0.7 MG/DL (ref 0.5–0.9)
EOSINOPHIL # BLD: 0.07 K/UL (ref 0–0.6)
EOSINOPHIL NFR BLD: 0.5 % (ref 0–5)
ERYTHROCYTE [DISTWIDTH] IN BLOOD BY AUTOMATED COUNT: 13.8 % (ref 11.5–14.5)
GLUCOSE SERPL-MCNC: 99 MG/DL (ref 70–99)
HCT VFR BLD AUTO: 38.6 % (ref 37–47)
HGB BLD-MCNC: 13.8 G/DL (ref 12–16)
LYMPHOCYTES # BLD: 2.32 K/UL (ref 1.1–4.5)
LYMPHOCYTES NFR BLD: 17.4 % (ref 20–40)
MCH RBC QN AUTO: 33.6 PG (ref 27–31)
MCHC RBC AUTO-ENTMCNC: 35.8 G/DL (ref 33–37)
MCV RBC AUTO: 93.9 FL (ref 81–99)
MONOCYTES # BLD: 0.91 K/UL (ref 0–0.9)
MONOCYTES NFR BLD: 6.8 % (ref 1–10)
NEUTROPHILS # BLD: 9.27 K/UL (ref 1.5–7.5)
NEUTS SEG NFR BLD: 69.8 % (ref 50–65)
PLATELET # BLD AUTO: 353 K/UL (ref 130–400)
PMV BLD AUTO: 8.7 FL (ref 9.4–12.3)
POTASSIUM SERPL-SCNC: 3.9 MMOL/L (ref 3.5–5.1)
PROT SERPL-MCNC: 7.2 G/DL (ref 6.4–8.3)
RBC # BLD AUTO: 4.11 M/UL (ref 4.2–5.4)
SODIUM SERPL-SCNC: 139 MMOL/L (ref 136–145)
WBC # BLD AUTO: 13.3 K/UL (ref 4.8–10.8)

## 2025-05-23 PROCEDURE — 96413 CHEMO IV INFUSION 1 HR: CPT

## 2025-05-23 PROCEDURE — 96367 TX/PROPH/DG ADDL SEQ IV INF: CPT

## 2025-05-23 PROCEDURE — 36415 COLL VENOUS BLD VENIPUNCTURE: CPT

## 2025-05-23 PROCEDURE — 6360000002 HC RX W HCPCS: Performed by: INTERNAL MEDICINE

## 2025-05-23 PROCEDURE — 85025 COMPLETE CBC W/AUTO DIFF WBC: CPT

## 2025-05-23 PROCEDURE — 96411 CHEMO IV PUSH ADDL DRUG: CPT

## 2025-05-23 PROCEDURE — G8417 CALC BMI ABV UP PARAM F/U: HCPCS

## 2025-05-23 PROCEDURE — 4004F PT TOBACCO SCREEN RCVD TLK: CPT | Performed by: INTERNAL MEDICINE

## 2025-05-23 PROCEDURE — 96375 TX/PRO/DX INJ NEW DRUG ADDON: CPT

## 2025-05-23 PROCEDURE — 99214 OFFICE O/P EST MOD 30 MIN: CPT | Performed by: INTERNAL MEDICINE

## 2025-05-23 PROCEDURE — 99215 OFFICE O/P EST HI 40 MIN: CPT

## 2025-05-23 PROCEDURE — 96415 CHEMO IV INFUSION ADDL HR: CPT

## 2025-05-23 PROCEDURE — G8417 CALC BMI ABV UP PARAM F/U: HCPCS | Performed by: INTERNAL MEDICINE

## 2025-05-23 PROCEDURE — 2500000003 HC RX 250 WO HCPCS: Performed by: INTERNAL MEDICINE

## 2025-05-23 PROCEDURE — 4004F PT TOBACCO SCREEN RCVD TLK: CPT

## 2025-05-23 PROCEDURE — G8428 CUR MEDS NOT DOCUMENT: HCPCS

## 2025-05-23 PROCEDURE — 2580000003 HC RX 258: Performed by: INTERNAL MEDICINE

## 2025-05-23 PROCEDURE — 96417 CHEMO IV INFUS EACH ADDL SEQ: CPT

## 2025-05-23 PROCEDURE — 80053 COMPREHEN METABOLIC PANEL: CPT

## 2025-05-23 PROCEDURE — G2211 COMPLEX E/M VISIT ADD ON: HCPCS | Performed by: INTERNAL MEDICINE

## 2025-05-23 PROCEDURE — G8427 DOCREV CUR MEDS BY ELIG CLIN: HCPCS | Performed by: INTERNAL MEDICINE

## 2025-05-23 PROCEDURE — 6370000000 HC RX 637 (ALT 250 FOR IP): Performed by: INTERNAL MEDICINE

## 2025-05-23 RX ORDER — SODIUM CHLORIDE 9 MG/ML
INJECTION, SOLUTION INTRAVENOUS CONTINUOUS
Status: CANCELLED | OUTPATIENT
Start: 2025-05-23

## 2025-05-23 RX ORDER — FAMOTIDINE 10 MG/ML
20 INJECTION, SOLUTION INTRAVENOUS
Status: CANCELLED | OUTPATIENT
Start: 2025-05-23

## 2025-05-23 RX ORDER — FAMOTIDINE 10 MG/ML
20 INJECTION, SOLUTION INTRAVENOUS ONCE
Status: COMPLETED | OUTPATIENT
Start: 2025-05-23 | End: 2025-05-23

## 2025-05-23 RX ORDER — PROCHLORPERAZINE EDISYLATE 5 MG/ML
5 INJECTION INTRAMUSCULAR; INTRAVENOUS
Status: CANCELLED | OUTPATIENT
Start: 2025-05-23

## 2025-05-23 RX ORDER — HYDROCODONE BITARTRATE AND ACETAMINOPHEN 5; 325 MG/1; MG/1
1 TABLET ORAL EVERY 6 HOURS PRN
Qty: 120 TABLET | Refills: 0 | Status: SHIPPED | OUTPATIENT
Start: 2025-05-23 | End: 2025-06-22

## 2025-05-23 RX ORDER — ONDANSETRON 2 MG/ML
8 INJECTION INTRAMUSCULAR; INTRAVENOUS
Status: CANCELLED | OUTPATIENT
Start: 2025-05-23

## 2025-05-23 RX ORDER — ACETAMINOPHEN 325 MG/1
650 TABLET ORAL
Status: CANCELLED | OUTPATIENT
Start: 2025-05-23

## 2025-05-23 RX ORDER — ACETAMINOPHEN 500 MG
1000 TABLET ORAL ONCE
Status: COMPLETED | OUTPATIENT
Start: 2025-05-23 | End: 2025-05-23

## 2025-05-23 RX ORDER — SODIUM CHLORIDE 0.9 % (FLUSH) 0.9 %
5-40 SYRINGE (ML) INJECTION PRN
Status: CANCELLED | OUTPATIENT
Start: 2025-05-23

## 2025-05-23 RX ORDER — PALONOSETRON 0.05 MG/ML
0.25 INJECTION, SOLUTION INTRAVENOUS ONCE
Status: COMPLETED | OUTPATIENT
Start: 2025-05-23 | End: 2025-05-23

## 2025-05-23 RX ORDER — MEPERIDINE HYDROCHLORIDE 50 MG/ML
12.5 INJECTION INTRAMUSCULAR; INTRAVENOUS; SUBCUTANEOUS PRN
Status: CANCELLED | OUTPATIENT
Start: 2025-05-23

## 2025-05-23 RX ORDER — HEPARIN 100 UNIT/ML
500 SYRINGE INTRAVENOUS PRN
Status: DISCONTINUED | OUTPATIENT
Start: 2025-05-23 | End: 2025-05-24 | Stop reason: HOSPADM

## 2025-05-23 RX ORDER — DIPHENHYDRAMINE HYDROCHLORIDE 50 MG/ML
50 INJECTION, SOLUTION INTRAMUSCULAR; INTRAVENOUS
Status: CANCELLED | OUTPATIENT
Start: 2025-05-23

## 2025-05-23 RX ORDER — SODIUM CHLORIDE 0.9 % (FLUSH) 0.9 %
5-40 SYRINGE (ML) INJECTION PRN
Status: DISCONTINUED | OUTPATIENT
Start: 2025-05-23 | End: 2025-05-24 | Stop reason: HOSPADM

## 2025-05-23 RX ORDER — DIPHENHYDRAMINE HYDROCHLORIDE 50 MG/ML
50 INJECTION, SOLUTION INTRAMUSCULAR; INTRAVENOUS ONCE
Status: COMPLETED | OUTPATIENT
Start: 2025-05-23 | End: 2025-05-23

## 2025-05-23 RX ORDER — HYDROCORTISONE SODIUM SUCCINATE 100 MG/2ML
100 INJECTION INTRAMUSCULAR; INTRAVENOUS
Status: CANCELLED | OUTPATIENT
Start: 2025-05-23

## 2025-05-23 RX ORDER — SODIUM CHLORIDE 9 MG/ML
5-250 INJECTION, SOLUTION INTRAVENOUS PRN
Status: CANCELLED | OUTPATIENT
Start: 2025-05-23

## 2025-05-23 RX ORDER — EPINEPHRINE 1 MG/ML
0.3 INJECTION, SOLUTION, CONCENTRATE INTRAVENOUS PRN
Status: CANCELLED | OUTPATIENT
Start: 2025-05-23

## 2025-05-23 RX ORDER — HEPARIN SODIUM (PORCINE) LOCK FLUSH IV SOLN 100 UNIT/ML 100 UNIT/ML
500 SOLUTION INTRAVENOUS PRN
Status: CANCELLED | OUTPATIENT
Start: 2025-05-23

## 2025-05-23 RX ORDER — DEXAMETHASONE SODIUM PHOSPHATE 10 MG/ML
10 INJECTION, SOLUTION INTRAMUSCULAR; INTRAVENOUS
Status: COMPLETED | OUTPATIENT
Start: 2025-05-23 | End: 2025-05-23

## 2025-05-23 RX ORDER — ALBUTEROL SULFATE 90 UG/1
4 INHALANT RESPIRATORY (INHALATION) PRN
Status: CANCELLED | OUTPATIENT
Start: 2025-05-23

## 2025-05-23 RX ADMIN — PALONOSETRON 0.25 MG: 0.05 INJECTION, SOLUTION INTRAVENOUS at 09:05

## 2025-05-23 RX ADMIN — DOXORUBICIN HYDROCHLORIDE 110 MG: 2 INJECTION, SOLUTION INTRAVENOUS at 13:48

## 2025-05-23 RX ADMIN — DEXAMETHASONE SODIUM PHOSPHATE 10 MG: 10 INJECTION, SOLUTION INTRAMUSCULAR; INTRAVENOUS at 09:05

## 2025-05-23 RX ADMIN — CYCLOPHOSPHAMIDE 1660 MG: 2 INJECTION, POWDER, FOR SOLUTION INTRAVENOUS; ORAL at 14:18

## 2025-05-23 RX ADMIN — SODIUM CHLORIDE, PRESERVATIVE FREE 10 ML: 5 INJECTION INTRAVENOUS at 14:50

## 2025-05-23 RX ADMIN — DIPHENHYDRAMINE HYDROCHLORIDE 50 MG: 50 INJECTION INTRAMUSCULAR; INTRAVENOUS at 09:05

## 2025-05-23 RX ADMIN — ACETAMINOPHEN 1000 MG: 500 TABLET, FILM COATED ORAL at 09:05

## 2025-05-23 RX ADMIN — VINCRISTINE SULFATE 2 MG: 1 INJECTION, SOLUTION INTRAVENOUS at 14:05

## 2025-05-23 RX ADMIN — HEPARIN 500 UNITS: 100 SYRINGE at 14:49

## 2025-05-23 RX ADMIN — FAMOTIDINE 20 MG: 10 INJECTION, SOLUTION INTRAVENOUS at 09:05

## 2025-05-23 RX ADMIN — SODIUM CHLORIDE 150 MG: 0.9 INJECTION, SOLUTION INTRAVENOUS at 09:17

## 2025-05-23 RX ADMIN — SODIUM CHLORIDE 800 MG: 0.9 INJECTION, SOLUTION INTRAVENOUS at 09:46

## 2025-05-23 NOTE — PROGRESS NOTES
[facility] Oncology & Hematology  [address] [place] [address] [place] [address]  Phone: [phone]  Fax: [phone]    Una Foy, MS, RD, LD   Elvia Lynch 44 y.o.   Diagnosis, staging, date of diagnosis: Stg IV diffuse large B-cell lymphoma, March 2025  Current Treatment: R-CHOP q3 weeks x6 cycles    Comprehensive Nutrition Assessment    Type and Reason for Visit:  Initial, Consult    Malnutrition Assessment:  Malnutrition Status:  No malnutrition (05/23/25 1040)    Context:  Chronic Illness       Nutrition Assessment:   Referral received from Dr. Gertrude Park MD for pt with dx DLBCL. Pt presents to clinic for cycle 2 of R-CHOP. Pt is adequately nourished with no recent wt loss or significant changes to eating pattern. Pt reports great appetite which she attributes to steroids. Pt denies any significant diarrhea, constipation, or N/V. She does endorse mild soreness in the oral cavity. She is using Magic Mouthwash with improvement.     Diet History:  Pt reports eating 3 meals daily and snacking consistently between meals. She denies any specific food aversions. She endorses a high protein diet as she enjoys most meats and drinks 2 Premier Protein shakes daily. Otherwise, she drinks water and Gatorade Zero.  Pt states she takes a MVI daily and many other supplements but is not able to list them to me. They are not on her medication list.     Nutrition Related Laboratory Data:  Na+=139  K+=3.9  Glu=99  BUN:Cr=19  GFR >90    Anthropometric Measures:  Height: 162.6 cm (5' 4\")  Ideal Body Weight (IBW): 120 lbs (55 kg)       Current Body Weight: 107 kg (236 lb), 196.7 % IBW.    Current BMI (kg/m2): 40.5  Wt Readings from Last 5 Encounters:   05/23/25 107 kg (236 lb)   05/02/25 105.6 kg (232 lb 14.4 oz)   04/29/25 104.3 kg (230 lb)   04/21/25 104.3 kg (230 lb)   04/17/25 104.8 kg (231 lb)      Nutrition Interventions:   Food and/or Nutrient Delivery: Modify Current Diet,

## 2025-05-23 NOTE — PROGRESS NOTES
Supportive Care/Community Based Palliative Care  Initial Consultation Note      Patient Name:  Elvia Lynch  Medical Record Number:  [id number]  YOB: 1980    Date of Visit: 5/23/2025  Location of Visit:  Other - [facility]    Referring Provider: Gertrude Park MD  Patient Care Team:  Delmy Peña APRN - CNP as PCP - Viridiana Ferguson APRN - CNP as Advanced Practice Nurse (Hospice and Palliative Medicine)    Reason for Consult:   Goals of care   Symptom Management    ACP  Family Support  Patient Support    History obtained from:  patient, electronic medical record    PALLIATIVE DIAGNOSES AND ORDERS/RECOMMENDATIONS/PLAN:   1. Cancer associated pain  Requested order for Norco 5 mg every 6 hours as needed per oncology    2. Diffuse large B-cell lymphoma of intra-abdominal lymph nodes (HCC)  Continue palliative chemotherapy  Patient states if she enters remission, she would not want to reinitiate any form of chemotherapy if disease progression were found    3. Encounter for palliative care  Current goals of care include: Continue treatment with palliative intent, Preserve independence/control/autonomy, Maintain or improve functional status, Maintain or improve quality of life, Remain at home    Encourage completion of advance care planning documentation, social work available to assist  Code status discussed and patient will continue to consider options.  Will continue goals of care discussions based on clinical course  Follow up office visit in 2-3 weeks and as needed    CHIEF COMPLAINT:     Chief Complaint   Patient presents with    Referral - General     Stage IV diffuse B-cell lymphoma -discussed goals of care, CODE STATUS, symptom management       CLINICAL SUMMARY:          Elvia Lynch is a 44 y.o. female with PMH of stage IV diffuse large B-cell lymphoma, hypertension, anxiety, depression, hyperlipidemia, history of carpal tunnel syndrome, obesity, and other

## 2025-05-23 NOTE — TELEPHONE ENCOUNTER
Spoke with Palliative Nurse Viridiana BOOGIE. She meet with patient today. She recommends Norco 5 mg every 6 hours PRN. I sent script to MD for approval. Pharmacy verified. Electronically signed by Keena Sloan RN on 5/23/2025 at 11:33 AM

## 2025-05-29 ENCOUNTER — TELEPHONE (OUTPATIENT)
Dept: PALLATIVE CARE | Age: 45
End: 2025-05-29

## 2025-05-29 RX ORDER — NYSTATIN 100000 [USP'U]/ML
500000 SUSPENSION ORAL 4 TIMES DAILY
Qty: 1 EACH | Refills: 1 | Status: SHIPPED | OUTPATIENT
Start: 2025-05-29

## 2025-05-29 NOTE — TELEPHONE ENCOUNTER
Nursing staff at cancer center received call from patient stating she had treatment last week and believes she has thrush now.     Order sent for nystatin mouthwash to

## 2025-06-02 ENCOUNTER — FOLLOWUP TELEPHONE ENCOUNTER (OUTPATIENT)
Dept: HEMATOLOGY | Age: 45
End: 2025-06-02

## 2025-06-02 NOTE — TELEPHONE ENCOUNTER
ROSEAMRIE Salazar called patient to inform her the head coverings we ordered on her behalf from [employer] courtesy of the [facility] have arrived and are ready to be picked up at her convenience.  Patient states that she will be in town on 06.09.25 for another appointment and will pick them up on that date. SW advised that she would put them in a bag for her at the  with her name on it.  Understanding voiced.

## 2025-06-06 ENCOUNTER — CLINICAL DOCUMENTATION (OUTPATIENT)
Dept: HEMATOLOGY | Age: 45
End: 2025-06-06

## 2025-06-06 NOTE — PROGRESS NOTES
ROSEMARIE Salazar called patient to inform her the head coverings we ordered on her behalf from [employer] courtesy of the [facility] have arrived and are ready to be picked up at her convenience.  Patient is planning to  her wigs on Friday 06.06.25 at the  of the [facility].

## 2025-06-09 ENCOUNTER — HOSPITAL ENCOUNTER (OUTPATIENT)
Dept: NUCLEAR MEDICINE | Age: 45
Discharge: HOME | End: 2025-06-11
Attending: INTERNAL MEDICINE
Payer: MEDICAID

## 2025-06-09 DIAGNOSIS — C83.38 DIFFUSE LARGE B-CELL LYMPHOMA OF LYMPH NODES OF MULTIPLE REGIONS (HCC): ICD-10-CM

## 2025-06-09 LAB
GLUCOSE BLD-MCNC: 107 MG/DL (ref 70–99)
PERFORMED ON: ABNORMAL

## 2025-06-09 PROCEDURE — 78815 PET IMAGE W/CT SKULL-THIGH: CPT

## 2025-06-09 PROCEDURE — A9609 HC RX DIAGNOSTIC RADIOPHARMACEUTICAL: HCPCS | Performed by: INTERNAL MEDICINE

## 2025-06-09 PROCEDURE — 82962 GLUCOSE BLOOD TEST: CPT

## 2025-06-09 PROCEDURE — 3430000000 HC RX DIAGNOSTIC RADIOPHARMACEUTICAL: Performed by: INTERNAL MEDICINE

## 2025-06-09 RX ORDER — FLUDEOXYGLUCOSE F 18 200 MCI/ML
15 INJECTION, SOLUTION INTRAVENOUS ONCE
Status: COMPLETED | OUTPATIENT
Start: 2025-06-09 | End: 2025-06-09

## 2025-06-09 RX ADMIN — FLUDEOXYGLUCOSE F 18 15 MILLICURIE: 200 INJECTION, SOLUTION INTRAVENOUS at 14:13

## 2025-06-12 NOTE — PROGRESS NOTES
MEDICAL ONCOLOGY PROGRESS NOTE     Patient Name: Elvia Lynch  MRN: [id number]  YOB: 1980  Date of evaluation: 6/13/2025    HISTORY OF PRESENT ILLNESS:  History of Present Illness  Reason for MD visit-toxicity monitoring/disease management  The patient returns today for a scheduled follow-up visit to monitor side effects of treatment, laboratorial monitoring, disease management and further treatment recommendations.    The patient is a 44-year-old female who has been diagnosed with high-grade diffuse large B-cell lymphoma, germinal center phenotype, Ki-67 80%, IPI score of 3, stage IV. Diagnosed in 03/2025. She presents today for cycle number 3 out of 6. She had a repeat PET scan on 06/09/2025.    She reports feeling well overall but expresses uncertainty regarding the interpretation of her recent PET scan results. She experienced oral thrush, bone pain in her arms and legs, and constipation following her last chemotherapy session. She is not receiving growth factor treatment. She reports no nausea or fever. Fatigue and sleepiness were noted due to an inadvertent morning dose of Zyprexa, which she typically takes at night. Thrush has been managed with nystatin and Miracle mouthwash, which she finds beneficial. She has not required any antiemetic medication.        Diagnosis  Diffuse large B-cell lymphoma, liver, March 2025  Germinal center type, High grade  Ki-67 of 80%, Non double expressor  FISH: BCL-6 Rearrangement, Negative rearrangement BCL2 and MYC   Stage IV  IPI III    Treatment Summary  5/2/2025 Initiate R-CHOP with Rituxan, Cyclophosphamide, Adriamycin, Vincristine, Prednisone + GCSF every 3 weeks x6 cycles, per recommendations from Dr Bryan Merritt/[facility], treatment consent signed 4/11/25    Cancer History  Elvia Lynch was first seen by me on 4/11/2025.  She was referred by Dr. Bryan Merritt, [facility] for a diagnosis of diffuse large B-cell lymphoma, high-grade with rearrangement cell 6 send no

## 2025-06-13 ENCOUNTER — HOSPITAL ENCOUNTER (OUTPATIENT)
Dept: INFUSION THERAPY | Age: 45
Discharge: HOME | End: 2025-06-13
Payer: MEDICAID

## 2025-06-13 ENCOUNTER — OFFICE VISIT (OUTPATIENT)
Dept: HEMATOLOGY | Age: 45
End: 2025-06-13
Payer: MEDICAID

## 2025-06-13 VITALS
HEIGHT: 64 IN | SYSTOLIC BLOOD PRESSURE: 98 MMHG | TEMPERATURE: 97.4 F | OXYGEN SATURATION: 97 % | HEART RATE: 70 BPM | WEIGHT: 239.4 LBS | RESPIRATION RATE: 18 BRPM | BODY MASS INDEX: 40.87 KG/M2 | DIASTOLIC BLOOD PRESSURE: 58 MMHG

## 2025-06-13 VITALS
DIASTOLIC BLOOD PRESSURE: 54 MMHG | RESPIRATION RATE: 18 BRPM | SYSTOLIC BLOOD PRESSURE: 103 MMHG | TEMPERATURE: 97.4 F | OXYGEN SATURATION: 96 % | HEART RATE: 67 BPM

## 2025-06-13 DIAGNOSIS — K59.03 DRUG INDUCED CONSTIPATION: ICD-10-CM

## 2025-06-13 DIAGNOSIS — M89.8X9 BONE PAIN: ICD-10-CM

## 2025-06-13 DIAGNOSIS — Z51.12 ENCOUNTER FOR ANTINEOPLASTIC IMMUNOTHERAPY: ICD-10-CM

## 2025-06-13 DIAGNOSIS — Z51.11 CHEMOTHERAPY MANAGEMENT, ENCOUNTER FOR: ICD-10-CM

## 2025-06-13 DIAGNOSIS — C83.33 DIFFUSE LARGE B-CELL LYMPHOMA OF INTRA-ABDOMINAL LYMPH NODES (HCC): Primary | ICD-10-CM

## 2025-06-13 DIAGNOSIS — C80.1 CANCER (HCC): Primary | ICD-10-CM

## 2025-06-13 DIAGNOSIS — R11.2 CHEMOTHERAPY INDUCED NAUSEA AND VOMITING: ICD-10-CM

## 2025-06-13 DIAGNOSIS — T45.1X5A CHEMOTHERAPY INDUCED NAUSEA AND VOMITING: ICD-10-CM

## 2025-06-13 DIAGNOSIS — T45.1X5D ADVERSE EFFECT OF CHEMOTHERAPY, SUBSEQUENT ENCOUNTER: ICD-10-CM

## 2025-06-13 DIAGNOSIS — Z71.89 CARE PLAN DISCUSSED WITH PATIENT: ICD-10-CM

## 2025-06-13 DIAGNOSIS — K12.32 DRUG-INDUCED MUCOSITIS: ICD-10-CM

## 2025-06-13 DIAGNOSIS — B37.0 ORAL THRUSH: ICD-10-CM

## 2025-06-13 LAB
ALBUMIN SERPL-MCNC: 4 G/DL (ref 3.5–5.2)
ALP SERPL-CCNC: 138 U/L (ref 35–104)
ALT SERPL-CCNC: 13 U/L (ref 5–33)
ANION GAP SERPL CALCULATED.3IONS-SCNC: 14 MMOL/L (ref 7–19)
AST SERPL-CCNC: 16 U/L (ref 5–32)
BASOPHILS # BLD: 0.1 K/UL (ref 0–0.2)
BASOPHILS NFR BLD: 0.7 % (ref 0–1)
BILIRUB SERPL-MCNC: <0.2 MG/DL (ref 0–1.2)
BUN SERPL-MCNC: 11 MG/DL (ref 6–20)
CALCIUM SERPL-MCNC: 8.9 MG/DL (ref 8.6–10)
CHLORIDE SERPL-SCNC: 103 MMOL/L (ref 98–107)
CO2 SERPL-SCNC: 24 MMOL/L (ref 22–29)
CREAT SERPL-MCNC: 0.7 MG/DL (ref 0.5–0.9)
EOSINOPHIL # BLD: 0.05 K/UL (ref 0–0.6)
EOSINOPHIL NFR BLD: 0.4 % (ref 0–5)
ERYTHROCYTE [DISTWIDTH] IN BLOOD BY AUTOMATED COUNT: 13.2 % (ref 11.5–14.5)
GLUCOSE SERPL-MCNC: 171 MG/DL (ref 70–99)
HCT VFR BLD AUTO: 38.1 % (ref 37–47)
HGB BLD-MCNC: 13.5 G/DL (ref 12–16)
LYMPHOCYTES # BLD: 0.95 K/UL (ref 1.1–4.5)
LYMPHOCYTES NFR BLD: 7.1 % (ref 20–40)
MAGNESIUM SERPL-MCNC: 2 MG/DL (ref 1.6–2.6)
MCH RBC QN AUTO: 33.8 PG (ref 27–31)
MCHC RBC AUTO-ENTMCNC: 35.4 G/DL (ref 33–37)
MCV RBC AUTO: 95.3 FL (ref 81–99)
MONOCYTES # BLD: 0.49 K/UL (ref 0–0.9)
MONOCYTES NFR BLD: 3.6 % (ref 1–10)
NEUTROPHILS # BLD: 11.23 K/UL (ref 1.5–7.5)
NEUTS SEG NFR BLD: 83.7 % (ref 50–65)
PLATELET # BLD AUTO: 308 K/UL (ref 130–400)
PMV BLD AUTO: 9.2 FL (ref 9.4–12.3)
POTASSIUM SERPL-SCNC: 3.7 MMOL/L (ref 3.5–5.1)
PROT SERPL-MCNC: 6.8 G/DL (ref 6.4–8.3)
RBC # BLD AUTO: 4 M/UL (ref 4.2–5.4)
SODIUM SERPL-SCNC: 141 MMOL/L (ref 136–145)
WBC # BLD AUTO: 13.43 K/UL (ref 4.8–10.8)

## 2025-06-13 PROCEDURE — 2580000003 HC RX 258: Performed by: INTERNAL MEDICINE

## 2025-06-13 PROCEDURE — 85025 COMPLETE CBC W/AUTO DIFF WBC: CPT

## 2025-06-13 PROCEDURE — 80053 COMPREHEN METABOLIC PANEL: CPT

## 2025-06-13 PROCEDURE — 36415 COLL VENOUS BLD VENIPUNCTURE: CPT

## 2025-06-13 PROCEDURE — 2500000003 HC RX 250 WO HCPCS: Performed by: INTERNAL MEDICINE

## 2025-06-13 PROCEDURE — 6360000002 HC RX W HCPCS: Performed by: INTERNAL MEDICINE

## 2025-06-13 PROCEDURE — 96413 CHEMO IV INFUSION 1 HR: CPT

## 2025-06-13 PROCEDURE — 6370000000 HC RX 637 (ALT 250 FOR IP): Performed by: INTERNAL MEDICINE

## 2025-06-13 PROCEDURE — 96375 TX/PRO/DX INJ NEW DRUG ADDON: CPT

## 2025-06-13 PROCEDURE — 96417 CHEMO IV INFUS EACH ADDL SEQ: CPT

## 2025-06-13 PROCEDURE — 96367 TX/PROPH/DG ADDL SEQ IV INF: CPT

## 2025-06-13 PROCEDURE — 83735 ASSAY OF MAGNESIUM: CPT

## 2025-06-13 PROCEDURE — 96411 CHEMO IV PUSH ADDL DRUG: CPT

## 2025-06-13 PROCEDURE — 96415 CHEMO IV INFUSION ADDL HR: CPT

## 2025-06-13 RX ORDER — FAMOTIDINE 10 MG/ML
20 INJECTION, SOLUTION INTRAVENOUS
Status: CANCELLED | OUTPATIENT
Start: 2025-06-13

## 2025-06-13 RX ORDER — HYDROCORTISONE SODIUM SUCCINATE 100 MG/2ML
100 INJECTION INTRAMUSCULAR; INTRAVENOUS
Status: CANCELLED | OUTPATIENT
Start: 2025-06-13

## 2025-06-13 RX ORDER — FAMOTIDINE 10 MG/ML
20 INJECTION, SOLUTION INTRAVENOUS ONCE
Status: CANCELLED
Start: 2025-06-13 | End: 2025-06-13

## 2025-06-13 RX ORDER — MEPERIDINE HYDROCHLORIDE 50 MG/ML
12.5 INJECTION INTRAMUSCULAR; INTRAVENOUS; SUBCUTANEOUS PRN
Status: CANCELLED | OUTPATIENT
Start: 2025-06-13

## 2025-06-13 RX ORDER — DEXAMETHASONE SODIUM PHOSPHATE 10 MG/ML
10 INJECTION, SOLUTION INTRAMUSCULAR; INTRAVENOUS
Status: COMPLETED | OUTPATIENT
Start: 2025-06-13 | End: 2025-06-13

## 2025-06-13 RX ORDER — ALBUTEROL SULFATE 90 UG/1
4 INHALANT RESPIRATORY (INHALATION) PRN
Status: CANCELLED | OUTPATIENT
Start: 2025-06-13

## 2025-06-13 RX ORDER — SODIUM CHLORIDE 9 MG/ML
5-250 INJECTION, SOLUTION INTRAVENOUS PRN
Status: CANCELLED | OUTPATIENT
Start: 2025-06-13

## 2025-06-13 RX ORDER — SODIUM CHLORIDE 0.9 % (FLUSH) 0.9 %
5-40 SYRINGE (ML) INJECTION PRN
Status: DISCONTINUED | OUTPATIENT
Start: 2025-06-13 | End: 2025-06-14 | Stop reason: HOSPADM

## 2025-06-13 RX ORDER — SODIUM CHLORIDE 9 MG/ML
5-250 INJECTION, SOLUTION INTRAVENOUS PRN
Status: DISCONTINUED | OUTPATIENT
Start: 2025-06-13 | End: 2025-06-14 | Stop reason: HOSPADM

## 2025-06-13 RX ORDER — HEPARIN 100 UNIT/ML
500 SYRINGE INTRAVENOUS PRN
Status: DISCONTINUED | OUTPATIENT
Start: 2025-06-13 | End: 2025-06-14 | Stop reason: HOSPADM

## 2025-06-13 RX ORDER — DIPHENHYDRAMINE HYDROCHLORIDE 50 MG/ML
50 INJECTION, SOLUTION INTRAMUSCULAR; INTRAVENOUS
Status: CANCELLED | OUTPATIENT
Start: 2025-06-13

## 2025-06-13 RX ORDER — FAMOTIDINE 10 MG/ML
20 INJECTION, SOLUTION INTRAVENOUS ONCE
Status: COMPLETED | OUTPATIENT
Start: 2025-06-13 | End: 2025-06-13

## 2025-06-13 RX ORDER — HEPARIN SODIUM (PORCINE) LOCK FLUSH IV SOLN 100 UNIT/ML 100 UNIT/ML
500 SOLUTION INTRAVENOUS PRN
Status: CANCELLED | OUTPATIENT
Start: 2025-06-13

## 2025-06-13 RX ORDER — ONDANSETRON 2 MG/ML
8 INJECTION INTRAMUSCULAR; INTRAVENOUS
Status: CANCELLED | OUTPATIENT
Start: 2025-06-13

## 2025-06-13 RX ORDER — SODIUM CHLORIDE 9 MG/ML
INJECTION, SOLUTION INTRAVENOUS CONTINUOUS
Status: CANCELLED | OUTPATIENT
Start: 2025-06-13

## 2025-06-13 RX ORDER — DIPHENHYDRAMINE HYDROCHLORIDE 50 MG/ML
12.5 INJECTION, SOLUTION INTRAMUSCULAR; INTRAVENOUS ONCE
Status: COMPLETED | OUTPATIENT
Start: 2025-06-13 | End: 2025-06-13

## 2025-06-13 RX ORDER — DIPHENHYDRAMINE HYDROCHLORIDE 50 MG/ML
12.5 INJECTION, SOLUTION INTRAMUSCULAR; INTRAVENOUS ONCE
Status: CANCELLED | OUTPATIENT
Start: 2025-06-13 | End: 2025-06-13

## 2025-06-13 RX ORDER — SODIUM CHLORIDE 0.9 % (FLUSH) 0.9 %
5-40 SYRINGE (ML) INJECTION PRN
Status: CANCELLED | OUTPATIENT
Start: 2025-06-13

## 2025-06-13 RX ORDER — PALONOSETRON 0.05 MG/ML
0.25 INJECTION, SOLUTION INTRAVENOUS ONCE
Status: COMPLETED | OUTPATIENT
Start: 2025-06-13 | End: 2025-06-13

## 2025-06-13 RX ORDER — ACETAMINOPHEN 325 MG/1
1000 TABLET ORAL ONCE
Status: CANCELLED | OUTPATIENT
Start: 2025-06-13 | End: 2025-06-13

## 2025-06-13 RX ORDER — EPINEPHRINE 1 MG/ML
0.3 INJECTION, SOLUTION, CONCENTRATE INTRAVENOUS PRN
Status: CANCELLED | OUTPATIENT
Start: 2025-06-13

## 2025-06-13 RX ORDER — PROCHLORPERAZINE EDISYLATE 5 MG/ML
5 INJECTION INTRAMUSCULAR; INTRAVENOUS
Status: CANCELLED | OUTPATIENT
Start: 2025-06-13

## 2025-06-13 RX ORDER — ACETAMINOPHEN 500 MG
1000 TABLET ORAL ONCE
Status: COMPLETED | OUTPATIENT
Start: 2025-06-13 | End: 2025-06-13

## 2025-06-13 RX ORDER — PALONOSETRON 0.05 MG/ML
0.25 INJECTION, SOLUTION INTRAVENOUS ONCE
Status: CANCELLED | OUTPATIENT
Start: 2025-06-13 | End: 2025-06-13

## 2025-06-13 RX ORDER — ACETAMINOPHEN 325 MG/1
650 TABLET ORAL
Status: CANCELLED | OUTPATIENT
Start: 2025-06-13

## 2025-06-13 RX ADMIN — DIPHENHYDRAMINE HYDROCHLORIDE 12.5 MG: 50 INJECTION INTRAMUSCULAR; INTRAVENOUS at 10:24

## 2025-06-13 RX ADMIN — PALONOSETRON 0.25 MG: 0.05 INJECTION, SOLUTION INTRAVENOUS at 10:24

## 2025-06-13 RX ADMIN — HEPARIN 500 UNITS: 100 SYRINGE at 13:49

## 2025-06-13 RX ADMIN — DOXORUBICIN HYDROCHLORIDE 110 MG: 2 INJECTION, SOLUTION INTRAVENOUS at 12:42

## 2025-06-13 RX ADMIN — CYCLOPHOSPHAMIDE 1660 MG: 2 INJECTION, POWDER, FOR SOLUTION INTRAVENOUS; ORAL at 13:15

## 2025-06-13 RX ADMIN — SODIUM CHLORIDE 800 MG: 0.9 INJECTION, SOLUTION INTRAVENOUS at 11:01

## 2025-06-13 RX ADMIN — SODIUM CHLORIDE, PRESERVATIVE FREE 10 ML: 5 INJECTION INTRAVENOUS at 13:49

## 2025-06-13 RX ADMIN — DEXAMETHASONE SODIUM PHOSPHATE 10 MG: 10 INJECTION, SOLUTION INTRAMUSCULAR; INTRAVENOUS at 10:24

## 2025-06-13 RX ADMIN — ACETAMINOPHEN 1000 MG: 500 TABLET, FILM COATED ORAL at 10:24

## 2025-06-13 RX ADMIN — VINCRISTINE SULFATE 2 MG: 1 INJECTION, SOLUTION INTRAVENOUS at 12:59

## 2025-06-13 RX ADMIN — FAMOTIDINE 20 MG: 10 INJECTION, SOLUTION INTRAVENOUS at 10:24

## 2025-06-13 RX ADMIN — SODIUM CHLORIDE 150 MG: 0.9 INJECTION, SOLUTION INTRAVENOUS at 10:33

## 2025-06-13 RX ADMIN — SODIUM CHLORIDE 50 ML/HR: 0.9 INJECTION, SOLUTION INTRAVENOUS at 10:25

## 2025-06-13 NOTE — PROGRESS NOTES
[facility]  [address] [place] [address] [place] [address]  Phone: [phone]  Fax: [phone]          Raven Lynch 44 y.o. White (non-) Unknown     Diagnosis     (Copied from Dr. Park's visit note.)   Diffuse large B-cell lymphoma, liver, March 2025  Germinal center type, High grade  Ki-67 of 80%, Non double expressor  FISH: BCL-6 Rearrangement, Negative rearrangement BCL2 and MYC   Stage IV  IPI III     Treatment Summary     (Copied from Dr. Park's visit note.)   5/2/2025 Initiate R-CHOP with Rituxan, Cyclophosphamide, Adriamycin, Vincristine, Prednisone + GCSF every 3 weeks x6 cycles, per recommendations from Dr Bryan Merritt/[facility], treatment consent signed 4/11/25  Current Outpatient Medications   Medication Sig Dispense Refill    nystatin (MYCOSTATIN) 777131 UNIT/ML suspension Take 5 mLs by mouth 4 times daily 1 each 1    HYDROcodone-acetaminophen (NORCO) 5-325 MG per tablet Take 1 tablet by mouth every 6 hours as needed for Pain for up to 30 days. Intended supply: 5 days. Take lowest dose possible to manage pain Max Daily Amount: 4 tablets 120 tablet 0    Magic Mouthwash (MIRACLE MOUTHWASH) Shake Well; For Oral Use. Lidocaine Viscous 2%; 80mL, Diphenhydramine 12.5MG/5Ml; 80mL, ALUM & MAG HYDROXIDE-SIMETH 200-200-20 MG/5ML; 80mL.Swish and spit 4 times daily as needed 480 mL 5    lidocaine-prilocaine (EMLA) 2.5-2.5 % cream Apply topically as needed. 1 each 3    predniSONE (DELTASONE) 50 MG tablet Take 2 tablets daily x5 days with each treatment every 3 weeks x6 cycles 50 tablet 0    cetirizine (ZYRTEC) 10 MG tablet Take 1 tablet by mouth daily as needed      fluticasone (VERAMYST) 27.5 MCG/SPRAY nasal spray 2 sprays by Nasal route daily as needed      hydroCHLOROthiazide (HYDRODIURIL) 25 MG tablet Take 1 tablet by mouth daily      hydrOXYzine HCl (ATARAX) 10 MG tablet Take 1 tablet by mouth every 4 hours as needed

## 2025-07-09 NOTE — PROGRESS NOTES
MEDICAL ONCOLOGY PROGRESS NOTE     Patient Name: Elvia Lynch  MRN: [id number]  YOB: 1980  Date of evaluation: 7/11/2025    HISTORY OF PRESENT ILLNESS:  History of Present Illness  Reason for MD visit-toxicity monitoring/disease management  The patient returns today for a scheduled follow-up visit to monitor side effects of treatment, laboratorial monitoring, disease management and further treatment recommendations.    The patient is a 44-year-old female with a history of diffuse large B-cell lymphoma, currently receiving curative intent R-CHOP cycle number 4. She has tolerated the treatment well but reports weight gain of 3 pounds, mild diarrhea, constipation, oral mucositis, and itching.    She reports experiencing thirst, sores, and bottom pain. Additionally, she had a rash on the back of her neck, which was very itchy but has since resolved. Nystatin was prescribed for the rash, which she found beneficial. She is not experiencing any fever.     She has reduced her smoking from 2 packs to half a pack per day and is making efforts to quit completely. She is not experiencing any sickness apart from the bone pain. She is seeking a refill of her pain medication due to severe bone pain following chemotherapy.     SOCIAL HISTORY  The patient admits to smoking and is currently trying to quit, having reduced from 2 packs to half a pack a day.    Diagnosis  Diffuse large B-cell lymphoma, liver, March 2025  Germinal center type, High grade  Ki-67 of 80%, Non double expressor  FISH: BCL-6 Rearrangement, Negative rearrangement BCL2 and MYC   Stage IV  IPI III    Treatment Summary  5/2/2025 Initiate R-CHOP with Rituxan, Cyclophosphamide, Adriamycin, Vincristine, Prednisone + GCSF every 3 weeks x6 cycles, per recommendations from Dr Bryan Merritt/[facility], treatment consent signed 4/11/25    Cancer History  Elvia Lynch was first seen by me on 4/11/2025.  She was referred by Dr. Bryan Merritt, [facility] for a diagnosis of

## 2025-07-11 ENCOUNTER — OFFICE VISIT (OUTPATIENT)
Dept: HEMATOLOGY | Age: 45
End: 2025-07-11
Payer: MEDICAID

## 2025-07-11 ENCOUNTER — HOSPITAL ENCOUNTER (OUTPATIENT)
Dept: INFUSION THERAPY | Age: 45
Discharge: HOME | End: 2025-07-11
Payer: MEDICAID

## 2025-07-11 VITALS
HEIGHT: 64 IN | RESPIRATION RATE: 18 BRPM | DIASTOLIC BLOOD PRESSURE: 59 MMHG | OXYGEN SATURATION: 99 % | WEIGHT: 242.47 LBS | SYSTOLIC BLOOD PRESSURE: 110 MMHG | TEMPERATURE: 97.7 F | HEART RATE: 70 BPM | BODY MASS INDEX: 41.39 KG/M2

## 2025-07-11 VITALS — HEIGHT: 64 IN | BODY MASS INDEX: 41.62 KG/M2

## 2025-07-11 VITALS
OXYGEN SATURATION: 99 % | SYSTOLIC BLOOD PRESSURE: 95 MMHG | DIASTOLIC BLOOD PRESSURE: 51 MMHG | HEART RATE: 64 BPM | TEMPERATURE: 97.7 F | RESPIRATION RATE: 18 BRPM

## 2025-07-11 DIAGNOSIS — T45.1X5D ADVERSE EFFECT OF CHEMOTHERAPY, SUBSEQUENT ENCOUNTER: ICD-10-CM

## 2025-07-11 DIAGNOSIS — Z51.11 CHEMOTHERAPY MANAGEMENT, ENCOUNTER FOR: ICD-10-CM

## 2025-07-11 DIAGNOSIS — K12.32 DRUG-INDUCED MUCOSITIS: ICD-10-CM

## 2025-07-11 DIAGNOSIS — D72.828 NEUTROPHILIC LEUKOCYTOSIS: ICD-10-CM

## 2025-07-11 DIAGNOSIS — C83.33 DIFFUSE LARGE B-CELL LYMPHOMA OF INTRA-ABDOMINAL LYMPH NODES (HCC): Primary | ICD-10-CM

## 2025-07-11 DIAGNOSIS — G89.3 CANCER RELATED PAIN: ICD-10-CM

## 2025-07-11 DIAGNOSIS — Z51.12 ENCOUNTER FOR ANTINEOPLASTIC IMMUNOTHERAPY: ICD-10-CM

## 2025-07-11 DIAGNOSIS — Z71.89 CARE PLAN DISCUSSED WITH PATIENT: ICD-10-CM

## 2025-07-11 LAB
ALBUMIN SERPL-MCNC: 4.1 G/DL (ref 3.5–5.2)
ALP SERPL-CCNC: 118 U/L (ref 35–104)
ALT SERPL-CCNC: 14 U/L (ref 5–33)
ANION GAP SERPL CALCULATED.3IONS-SCNC: 12 MMOL/L (ref 7–19)
AST SERPL-CCNC: 15 U/L (ref 5–32)
BASOPHILS # BLD: 0.06 K/UL (ref 0–0.2)
BASOPHILS NFR BLD: 0.4 % (ref 0–1)
BILIRUB SERPL-MCNC: 0.3 MG/DL (ref 0–1.2)
BUN SERPL-MCNC: 12 MG/DL (ref 6–20)
CALCIUM SERPL-MCNC: 9.1 MG/DL (ref 8.6–10)
CHLORIDE SERPL-SCNC: 103 MMOL/L (ref 98–107)
CO2 SERPL-SCNC: 25 MMOL/L (ref 22–29)
CREAT SERPL-MCNC: 0.6 MG/DL (ref 0.5–0.9)
EOSINOPHIL # BLD: 0.1 K/UL (ref 0–0.6)
EOSINOPHIL NFR BLD: 0.6 % (ref 0–5)
ERYTHROCYTE [DISTWIDTH] IN BLOOD BY AUTOMATED COUNT: 13.2 % (ref 11.5–14.5)
GLUCOSE SERPL-MCNC: 144 MG/DL (ref 70–99)
HCT VFR BLD AUTO: 35.6 % (ref 37–47)
HGB BLD-MCNC: 12.8 G/DL (ref 12–16)
LYMPHOCYTES # BLD: 1.19 K/UL (ref 1.1–4.5)
LYMPHOCYTES NFR BLD: 7.5 % (ref 20–40)
MAGNESIUM SERPL-MCNC: 1.9 MG/DL (ref 1.6–2.6)
MCH RBC QN AUTO: 34.5 PG (ref 27–31)
MCHC RBC AUTO-ENTMCNC: 36 G/DL (ref 33–37)
MCV RBC AUTO: 96 FL (ref 81–99)
MONOCYTES # BLD: 0.74 K/UL (ref 0–0.9)
MONOCYTES NFR BLD: 4.7 % (ref 1–10)
NEUTROPHILS # BLD: 13.65 K/UL (ref 1.5–7.5)
NEUTS SEG NFR BLD: 85.8 % (ref 50–65)
PLATELET # BLD AUTO: 274 K/UL (ref 130–400)
PMV BLD AUTO: 9.4 FL (ref 9.4–12.3)
POTASSIUM SERPL-SCNC: 3.7 MMOL/L (ref 3.5–5.1)
PROT SERPL-MCNC: 6.7 G/DL (ref 6.4–8.3)
RBC # BLD AUTO: 3.71 M/UL (ref 4.2–5.4)
SODIUM SERPL-SCNC: 140 MMOL/L (ref 136–145)
WBC # BLD AUTO: 15.9 K/UL (ref 4.8–10.8)

## 2025-07-11 PROCEDURE — 96411 CHEMO IV PUSH ADDL DRUG: CPT

## 2025-07-11 PROCEDURE — 6360000002 HC RX W HCPCS: Performed by: INTERNAL MEDICINE

## 2025-07-11 PROCEDURE — 96375 TX/PRO/DX INJ NEW DRUG ADDON: CPT

## 2025-07-11 PROCEDURE — 83735 ASSAY OF MAGNESIUM: CPT

## 2025-07-11 PROCEDURE — 96417 CHEMO IV INFUS EACH ADDL SEQ: CPT

## 2025-07-11 PROCEDURE — 80053 COMPREHEN METABOLIC PANEL: CPT

## 2025-07-11 PROCEDURE — 96413 CHEMO IV INFUSION 1 HR: CPT

## 2025-07-11 PROCEDURE — 6370000000 HC RX 637 (ALT 250 FOR IP): Performed by: INTERNAL MEDICINE

## 2025-07-11 PROCEDURE — 2580000003 HC RX 258: Performed by: INTERNAL MEDICINE

## 2025-07-11 PROCEDURE — 96368 THER/DIAG CONCURRENT INF: CPT

## 2025-07-11 PROCEDURE — 85025 COMPLETE CBC W/AUTO DIFF WBC: CPT

## 2025-07-11 PROCEDURE — 96415 CHEMO IV INFUSION ADDL HR: CPT

## 2025-07-11 PROCEDURE — 36415 COLL VENOUS BLD VENIPUNCTURE: CPT

## 2025-07-11 PROCEDURE — 96367 TX/PROPH/DG ADDL SEQ IV INF: CPT

## 2025-07-11 PROCEDURE — 96409 CHEMO IV PUSH SNGL DRUG: CPT

## 2025-07-11 PROCEDURE — 2500000003 HC RX 250 WO HCPCS: Performed by: INTERNAL MEDICINE

## 2025-07-11 RX ORDER — ACETAMINOPHEN 325 MG/1
650 TABLET ORAL
Status: CANCELLED | OUTPATIENT
Start: 2025-07-11

## 2025-07-11 RX ORDER — FAMOTIDINE 10 MG/ML
20 INJECTION, SOLUTION INTRAVENOUS
Status: CANCELLED | OUTPATIENT
Start: 2025-07-11

## 2025-07-11 RX ORDER — SODIUM CHLORIDE 9 MG/ML
5-250 INJECTION, SOLUTION INTRAVENOUS PRN
Status: CANCELLED | OUTPATIENT
Start: 2025-07-11

## 2025-07-11 RX ORDER — PROCHLORPERAZINE EDISYLATE 5 MG/ML
5 INJECTION INTRAMUSCULAR; INTRAVENOUS
Status: CANCELLED | OUTPATIENT
Start: 2025-07-11

## 2025-07-11 RX ORDER — ACETAMINOPHEN 500 MG
1000 TABLET ORAL ONCE
Status: COMPLETED | OUTPATIENT
Start: 2025-07-11 | End: 2025-07-11

## 2025-07-11 RX ORDER — MEPERIDINE HYDROCHLORIDE 50 MG/ML
12.5 INJECTION INTRAMUSCULAR; INTRAVENOUS; SUBCUTANEOUS PRN
Status: CANCELLED | OUTPATIENT
Start: 2025-07-11

## 2025-07-11 RX ORDER — DEXAMETHASONE SODIUM PHOSPHATE 10 MG/ML
10 INJECTION, SOLUTION INTRAMUSCULAR; INTRAVENOUS
Status: COMPLETED | OUTPATIENT
Start: 2025-07-11 | End: 2025-07-11

## 2025-07-11 RX ORDER — PALONOSETRON 0.05 MG/ML
0.25 INJECTION, SOLUTION INTRAVENOUS ONCE
Status: COMPLETED | OUTPATIENT
Start: 2025-07-11 | End: 2025-07-11

## 2025-07-11 RX ORDER — HYDROCORTISONE SODIUM SUCCINATE 100 MG/2ML
100 INJECTION INTRAMUSCULAR; INTRAVENOUS
Status: CANCELLED | OUTPATIENT
Start: 2025-07-11

## 2025-07-11 RX ORDER — SODIUM CHLORIDE 0.9 % (FLUSH) 0.9 %
5-40 SYRINGE (ML) INJECTION PRN
Status: DISCONTINUED | OUTPATIENT
Start: 2025-07-11 | End: 2025-07-12 | Stop reason: HOSPADM

## 2025-07-11 RX ORDER — HYDROCODONE BITARTRATE AND ACETAMINOPHEN 5; 325 MG/1; MG/1
1 TABLET ORAL EVERY 6 HOURS PRN
Qty: 120 TABLET | Refills: 0 | Status: SHIPPED | OUTPATIENT
Start: 2025-07-11 | End: 2025-08-10

## 2025-07-11 RX ORDER — HEPARIN 100 UNIT/ML
500 SYRINGE INTRAVENOUS PRN
Status: DISCONTINUED | OUTPATIENT
Start: 2025-07-11 | End: 2025-07-12 | Stop reason: HOSPADM

## 2025-07-11 RX ORDER — FAMOTIDINE 10 MG/ML
20 INJECTION, SOLUTION INTRAVENOUS ONCE
Status: COMPLETED | OUTPATIENT
Start: 2025-07-11 | End: 2025-07-11

## 2025-07-11 RX ORDER — SODIUM CHLORIDE 9 MG/ML
INJECTION, SOLUTION INTRAVENOUS CONTINUOUS
Status: CANCELLED | OUTPATIENT
Start: 2025-07-11

## 2025-07-11 RX ORDER — DIPHENHYDRAMINE HYDROCHLORIDE 50 MG/ML
50 INJECTION, SOLUTION INTRAMUSCULAR; INTRAVENOUS
Status: CANCELLED | OUTPATIENT
Start: 2025-07-11

## 2025-07-11 RX ORDER — SODIUM CHLORIDE 0.9 % (FLUSH) 0.9 %
5-40 SYRINGE (ML) INJECTION PRN
Status: CANCELLED | OUTPATIENT
Start: 2025-07-11

## 2025-07-11 RX ORDER — EPINEPHRINE 1 MG/ML
0.3 INJECTION, SOLUTION, CONCENTRATE INTRAVENOUS PRN
Status: CANCELLED | OUTPATIENT
Start: 2025-07-11

## 2025-07-11 RX ORDER — HEPARIN SODIUM (PORCINE) LOCK FLUSH IV SOLN 100 UNIT/ML 100 UNIT/ML
500 SOLUTION INTRAVENOUS PRN
Status: CANCELLED | OUTPATIENT
Start: 2025-07-11

## 2025-07-11 RX ORDER — ALBUTEROL SULFATE 90 UG/1
4 INHALANT RESPIRATORY (INHALATION) PRN
Status: CANCELLED | OUTPATIENT
Start: 2025-07-11

## 2025-07-11 RX ORDER — HYDROCODONE BITARTRATE AND ACETAMINOPHEN 5; 325 MG/1; MG/1
1 TABLET ORAL EVERY 6 HOURS PRN
COMMUNITY

## 2025-07-11 RX ORDER — DIPHENHYDRAMINE HYDROCHLORIDE 50 MG/ML
50 INJECTION, SOLUTION INTRAMUSCULAR; INTRAVENOUS ONCE
Status: COMPLETED | OUTPATIENT
Start: 2025-07-11 | End: 2025-07-11

## 2025-07-11 RX ORDER — ONDANSETRON 2 MG/ML
8 INJECTION INTRAMUSCULAR; INTRAVENOUS
Status: CANCELLED | OUTPATIENT
Start: 2025-07-11

## 2025-07-11 RX ADMIN — HEPARIN 500 UNITS: 100 SYRINGE at 14:47

## 2025-07-11 RX ADMIN — SODIUM CHLORIDE, PRESERVATIVE FREE 10 ML: 5 INJECTION INTRAVENOUS at 14:47

## 2025-07-11 RX ADMIN — ACETAMINOPHEN 1000 MG: 500 TABLET, FILM COATED ORAL at 10:48

## 2025-07-11 RX ADMIN — SODIUM CHLORIDE 800 MG: 0.9 INJECTION, SOLUTION INTRAVENOUS at 11:58

## 2025-07-11 RX ADMIN — DOXORUBICIN HYDROCHLORIDE 100 MG: 2 INJECTION, SOLUTION INTRAVENOUS at 13:37

## 2025-07-11 RX ADMIN — DEXAMETHASONE SODIUM PHOSPHATE 10 MG: 10 INJECTION, SOLUTION INTRAMUSCULAR; INTRAVENOUS at 10:49

## 2025-07-11 RX ADMIN — CYCLOPHOSPHAMIDE 1660 MG: 2 INJECTION, POWDER, FOR SOLUTION INTRAVENOUS; ORAL at 14:12

## 2025-07-11 RX ADMIN — DIPHENHYDRAMINE HYDROCHLORIDE 50 MG: 50 INJECTION INTRAMUSCULAR; INTRAVENOUS at 10:55

## 2025-07-11 RX ADMIN — SODIUM CHLORIDE 150 MG: 0.9 INJECTION, SOLUTION INTRAVENOUS at 11:14

## 2025-07-11 RX ADMIN — PALONOSETRON 0.25 MG: 0.05 INJECTION, SOLUTION INTRAVENOUS at 10:51

## 2025-07-11 RX ADMIN — VINCRISTINE SULFATE 2 MG: 1 INJECTION, SOLUTION INTRAVENOUS at 13:56

## 2025-07-11 RX ADMIN — FAMOTIDINE 20 MG: 10 INJECTION, SOLUTION INTRAVENOUS at 10:54

## 2025-07-11 NOTE — PROGRESS NOTES
[facility]  [address] [place] [address] [place] [address]  Phone: [phone]  Fax: [phone]    Una Foy, MS, RD, LD   Elvia Lynch 44 y.o.   Diagnosis, staging, date of diagnosis: Stg IV diffuse large B-cell lymphoma, March 2025  Current Treatment: R-CHOP q3 weeks x6 cycles    Comprehensive Nutrition Assessment    Type and Reason for Visit:  Reassess    Malnutrition Assessment:  Malnutrition Status:  No malnutrition (07/11/25 1126)    Context:  Chronic Illness       Nutrition Assessment:   Referral received from Dr. Gertrude Park MD for pt with dx DLBCL. RD follow-up completed 7/11/25. Pt presents to clinic for cycle 4 of R-CHOP. Pt remains adequately nourished with no recent wt loss or significant changes to eating pattern. Pt reports great appetite which she attributes to steroids. Pt denies any significant diarrhea, or N/V. She continues to endorse soreness in the oral cavity r/t thrush and mouth sores which improves a week or so after treatment with use of Nystatin and Magic Mouthwash. Pt also complains of constipation lasting close to a week following treatment. She uses Dulcolax but does not notice significant improvement.    Diet History:  Pt reports eating 3 meals daily and snacking consistently between meals. She denies any specific food aversions. She endorses a high protein diet as she enjoys most meats and drinks 2 Premier Protein shakes daily. Otherwise, she drinks water with added electrolytes and Gatorade Zero.  Pt states she takes a MVI daily and many other supplements but is not able to list them to me. They are not on her medication list.     Nutrition Related Laboratory Data:  Na+=140  K+=37  Zwr=619  BUN=12  Cr=0.6  GFR>90  Mag=1.9    Anthropometric Measures:  Height: 162.6 cm (5' 4\")  Ideal Body Weight (IBW): 120 lbs (55 kg)       Current Body Weight: 110 kg (242 lb 7.5 oz), 202.1 % IBW.    Current BMI (kg/m2): 41.6  Wt Readings

## 2025-07-11 NOTE — PROGRESS NOTES
Lab Results   Component Value Date    WBC 15.90 (H) 07/11/2025    HGB 12.8 07/11/2025    HCT 35.6 (L) 07/11/2025    MCV 96.0 07/11/2025     07/11/2025     Lab Results   Component Value Date    NEUTROABS 13.65 (H) 07/11/2025     Lab Results   Component Value Date     07/11/2025    K 3.7 07/11/2025     07/11/2025    CO2 25 07/11/2025    BUN 12 07/11/2025    CREATININE 0.6 07/11/2025    GLUCOSE 144 (H) 07/11/2025    CALCIUM 9.1 07/11/2025    BILITOT 0.3 07/11/2025    ALKPHOS 118 (H) 07/11/2025    AST 15 07/11/2025    ALT 14 07/11/2025    LABGLOM >90 07/11/2025

## 2025-07-30 NOTE — PROGRESS NOTES
MEDICAL ONCOLOGY PROGRESS NOTE     Patient Name: Elvia Lynch  MRN: [id number]  YOB: 1980  Date of evaluation: 8/1/2025    HISTORY OF PRESENT ILLNESS:  History of Present Illness  Reason for MD visit-toxicity monitoring/disease management  The patient returns today for a scheduled follow-up visit to monitor side effects of treatment, laboratorial monitoring, disease management and further treatment recommendations.    The patient is a 44-year-old female with a history of diffuse large B-cell lymphoma, currently receiving curative intent R-CHOP cycle number 5. The patient presents with complaints of increased neuropathy.    She reports persistent tingling sensations in her heels and hands, which are so severe that they cause pain upon standing. She is not diabetic. This is her fifth treatment. She also experiences bone pain in her legs and arms, particularly in the larger bones. She is currently on Zyprexa.    Diagnosis  Diffuse large B-cell lymphoma, liver, March 2025  Germinal center type, High grade  Ki-67 of 80%, Non double expressor  FISH: BCL-6 Rearrangement, Negative rearrangement BCL2 and MYC   Stage IV  IPI III    Treatment Summary  5/2/2025 Initiate R-CHOP with Rituxan, Cyclophosphamide, Adriamycin, Vincristine, Prednisone + GCSF every 3 weeks x6 cycles, per recommendations from Dr Bryan Merritt/[facility], treatment consent signed 4/11/25 8/1/25 Discontinue Vincristine due to neuropathy    Cancer History  Elvia Lynch was first seen by me on 4/11/2025.  She was referred by Dr. Bryan Merritt, [facility] for a diagnosis of diffuse large B-cell lymphoma, high-grade with rearrangement cell 6 send no rearrangement of c-Myc or Bcl-2.  Patient stage IV disease.  Patient was recommended 6 cycles of R-CHOP.  2/17/25 CT abd/pelvis ([facility]): Findings are compatible with subtle diffuse hepatic metastatic disease, likely from a neoplastic process within the left epigastrum.\"    3/3/25 Initial

## 2025-08-01 ENCOUNTER — OFFICE VISIT (OUTPATIENT)
Dept: PALLATIVE CARE | Age: 45
End: 2025-08-01
Payer: MEDICAID

## 2025-08-01 ENCOUNTER — OFFICE VISIT (OUTPATIENT)
Dept: HEMATOLOGY | Age: 45
End: 2025-08-01
Payer: MEDICAID

## 2025-08-01 ENCOUNTER — HOSPITAL ENCOUNTER (OUTPATIENT)
Dept: INFUSION THERAPY | Age: 45
Discharge: HOME | End: 2025-08-01
Payer: MEDICAID

## 2025-08-01 VITALS
OXYGEN SATURATION: 100 % | DIASTOLIC BLOOD PRESSURE: 64 MMHG | TEMPERATURE: 97.9 F | SYSTOLIC BLOOD PRESSURE: 114 MMHG | BODY MASS INDEX: 41.52 KG/M2 | WEIGHT: 243.2 LBS | RESPIRATION RATE: 18 BRPM | HEART RATE: 73 BPM | HEIGHT: 64 IN

## 2025-08-01 DIAGNOSIS — Z51.5 ENCOUNTER FOR PALLIATIVE CARE: ICD-10-CM

## 2025-08-01 DIAGNOSIS — R19.8 ALTERNATING CONSTIPATION AND DIARRHEA: ICD-10-CM

## 2025-08-01 DIAGNOSIS — C80.1 CANCER (HCC): Primary | ICD-10-CM

## 2025-08-01 DIAGNOSIS — G62.0 DRUG-INDUCED PERIPHERAL NEUROPATHY: Primary | ICD-10-CM

## 2025-08-01 DIAGNOSIS — Z71.89 CARE PLAN DISCUSSED WITH PATIENT: ICD-10-CM

## 2025-08-01 DIAGNOSIS — G89.3 CANCER ASSOCIATED PAIN: ICD-10-CM

## 2025-08-01 DIAGNOSIS — C83.33 DIFFUSE LARGE B-CELL LYMPHOMA OF INTRA-ABDOMINAL LYMPH NODES (HCC): ICD-10-CM

## 2025-08-01 DIAGNOSIS — T45.1X5D ADVERSE EFFECT OF CHEMOTHERAPY, SUBSEQUENT ENCOUNTER: ICD-10-CM

## 2025-08-01 DIAGNOSIS — C83.33 DIFFUSE LARGE B-CELL LYMPHOMA OF INTRA-ABDOMINAL LYMPH NODES (HCC): Primary | ICD-10-CM

## 2025-08-01 DIAGNOSIS — T45.1X5A CHEMOTHERAPY-INDUCED NEUROPATHY: ICD-10-CM

## 2025-08-01 DIAGNOSIS — G89.3 CANCER RELATED PAIN: ICD-10-CM

## 2025-08-01 DIAGNOSIS — Z51.11 CHEMOTHERAPY MANAGEMENT, ENCOUNTER FOR: ICD-10-CM

## 2025-08-01 DIAGNOSIS — G62.0 CHEMOTHERAPY-INDUCED NEUROPATHY: ICD-10-CM

## 2025-08-01 DIAGNOSIS — Z51.12 ENCOUNTER FOR ANTINEOPLASTIC IMMUNOTHERAPY: ICD-10-CM

## 2025-08-01 LAB
ALBUMIN SERPL-MCNC: 4.2 G/DL (ref 3.5–5.2)
ALP SERPL-CCNC: 117 U/L (ref 35–104)
ALT SERPL-CCNC: 15 U/L (ref 5–33)
ANION GAP SERPL CALCULATED.3IONS-SCNC: 12 MMOL/L (ref 7–19)
AST SERPL-CCNC: 20 U/L (ref 5–32)
BASOPHILS # BLD: 0.09 K/UL (ref 0–0.2)
BASOPHILS NFR BLD: 0.8 % (ref 0–1)
BILIRUB SERPL-MCNC: 0.3 MG/DL (ref 0–1.2)
BUN SERPL-MCNC: 11 MG/DL (ref 6–20)
CALCIUM SERPL-MCNC: 9.4 MG/DL (ref 8.6–10)
CHLORIDE SERPL-SCNC: 102 MMOL/L (ref 98–107)
CO2 SERPL-SCNC: 25 MMOL/L (ref 22–29)
CREAT SERPL-MCNC: 0.6 MG/DL (ref 0.5–0.9)
EOSINOPHIL # BLD: 0.03 K/UL (ref 0–0.6)
EOSINOPHIL NFR BLD: 0.3 % (ref 0–5)
ERYTHROCYTE [DISTWIDTH] IN BLOOD BY AUTOMATED COUNT: 12.8 % (ref 11.5–14.5)
GLUCOSE SERPL-MCNC: 159 MG/DL (ref 70–99)
HCT VFR BLD AUTO: 36.4 % (ref 37–47)
HGB BLD-MCNC: 13.1 G/DL (ref 12–16)
LYMPHOCYTES # BLD: 0.92 K/UL (ref 1.1–4.5)
LYMPHOCYTES NFR BLD: 7.8 % (ref 20–40)
MAGNESIUM SERPL-MCNC: 2 MG/DL (ref 1.6–2.6)
MCH RBC QN AUTO: 34.5 PG (ref 27–31)
MCHC RBC AUTO-ENTMCNC: 36 G/DL (ref 33–37)
MCV RBC AUTO: 95.8 FL (ref 81–99)
MONOCYTES # BLD: 0.47 K/UL (ref 0–0.9)
MONOCYTES NFR BLD: 4 % (ref 1–10)
NEUTROPHILS # BLD: 9.68 K/UL (ref 1.5–7.5)
NEUTS SEG NFR BLD: 81.4 % (ref 50–65)
PHOSPHATE SERPL-MCNC: 2.6 MG/DL (ref 2.5–4.5)
PLATELET # BLD AUTO: 310 K/UL (ref 130–400)
PMV BLD AUTO: 8.7 FL (ref 9.4–12.3)
POTASSIUM SERPL-SCNC: 3.7 MMOL/L (ref 3.5–5.1)
PROT SERPL-MCNC: 7.2 G/DL (ref 6.4–8.3)
RBC # BLD AUTO: 3.8 M/UL (ref 4.2–5.4)
SODIUM SERPL-SCNC: 139 MMOL/L (ref 136–145)
WBC # BLD AUTO: 11.87 K/UL (ref 4.8–10.8)

## 2025-08-01 PROCEDURE — G8417 CALC BMI ABV UP PARAM F/U: HCPCS

## 2025-08-01 PROCEDURE — 96411 CHEMO IV PUSH ADDL DRUG: CPT

## 2025-08-01 PROCEDURE — 4004F PT TOBACCO SCREEN RCVD TLK: CPT

## 2025-08-01 PROCEDURE — 6370000000 HC RX 637 (ALT 250 FOR IP): Performed by: INTERNAL MEDICINE

## 2025-08-01 PROCEDURE — 36415 COLL VENOUS BLD VENIPUNCTURE: CPT

## 2025-08-01 PROCEDURE — 96417 CHEMO IV INFUS EACH ADDL SEQ: CPT

## 2025-08-01 PROCEDURE — G8428 CUR MEDS NOT DOCUMENT: HCPCS

## 2025-08-01 PROCEDURE — 84100 ASSAY OF PHOSPHORUS: CPT

## 2025-08-01 PROCEDURE — 2500000003 HC RX 250 WO HCPCS: Performed by: INTERNAL MEDICINE

## 2025-08-01 PROCEDURE — 96415 CHEMO IV INFUSION ADDL HR: CPT

## 2025-08-01 PROCEDURE — 83735 ASSAY OF MAGNESIUM: CPT

## 2025-08-01 PROCEDURE — 2580000003 HC RX 258: Performed by: INTERNAL MEDICINE

## 2025-08-01 PROCEDURE — 96367 TX/PROPH/DG ADDL SEQ IV INF: CPT

## 2025-08-01 PROCEDURE — 96375 TX/PRO/DX INJ NEW DRUG ADDON: CPT

## 2025-08-01 PROCEDURE — 6360000002 HC RX W HCPCS: Performed by: INTERNAL MEDICINE

## 2025-08-01 PROCEDURE — 99215 OFFICE O/P EST HI 40 MIN: CPT

## 2025-08-01 PROCEDURE — 85025 COMPLETE CBC W/AUTO DIFF WBC: CPT

## 2025-08-01 PROCEDURE — 96413 CHEMO IV INFUSION 1 HR: CPT

## 2025-08-01 PROCEDURE — 80053 COMPREHEN METABOLIC PANEL: CPT

## 2025-08-01 RX ORDER — DEXAMETHASONE SODIUM PHOSPHATE 10 MG/ML
10 INJECTION, SOLUTION INTRAMUSCULAR; INTRAVENOUS
Status: COMPLETED | OUTPATIENT
Start: 2025-08-01 | End: 2025-08-01

## 2025-08-01 RX ORDER — ONDANSETRON 2 MG/ML
8 INJECTION INTRAMUSCULAR; INTRAVENOUS
Status: CANCELLED | OUTPATIENT
Start: 2025-08-01

## 2025-08-01 RX ORDER — SODIUM CHLORIDE 0.9 % (FLUSH) 0.9 %
5-40 SYRINGE (ML) INJECTION PRN
Status: CANCELLED | OUTPATIENT
Start: 2025-08-01

## 2025-08-01 RX ORDER — HYDROCORTISONE SODIUM SUCCINATE 100 MG/2ML
100 INJECTION INTRAMUSCULAR; INTRAVENOUS
Status: CANCELLED | OUTPATIENT
Start: 2025-08-01

## 2025-08-01 RX ORDER — ACETAMINOPHEN 325 MG/1
650 TABLET ORAL
Status: CANCELLED | OUTPATIENT
Start: 2025-08-01

## 2025-08-01 RX ORDER — ACETAMINOPHEN 500 MG
1000 TABLET ORAL ONCE
Status: COMPLETED | OUTPATIENT
Start: 2025-08-01 | End: 2025-08-01

## 2025-08-01 RX ORDER — SODIUM CHLORIDE 9 MG/ML
5-250 INJECTION, SOLUTION INTRAVENOUS PRN
Status: CANCELLED | OUTPATIENT
Start: 2025-08-01

## 2025-08-01 RX ORDER — FAMOTIDINE 10 MG/ML
20 INJECTION, SOLUTION INTRAVENOUS
Status: CANCELLED | OUTPATIENT
Start: 2025-08-01

## 2025-08-01 RX ORDER — FAMOTIDINE 10 MG/ML
20 INJECTION, SOLUTION INTRAVENOUS ONCE
Status: CANCELLED
Start: 2025-08-01 | End: 2025-08-01

## 2025-08-01 RX ORDER — DIPHENHYDRAMINE HYDROCHLORIDE 50 MG/ML
50 INJECTION, SOLUTION INTRAMUSCULAR; INTRAVENOUS ONCE
Status: COMPLETED | OUTPATIENT
Start: 2025-08-01 | End: 2025-08-01

## 2025-08-01 RX ORDER — FAMOTIDINE 10 MG/ML
20 INJECTION, SOLUTION INTRAVENOUS ONCE
Status: COMPLETED | OUTPATIENT
Start: 2025-08-01 | End: 2025-08-01

## 2025-08-01 RX ORDER — DULOXETIN HYDROCHLORIDE 30 MG/1
30 CAPSULE, DELAYED RELEASE ORAL DAILY
Qty: 7 CAPSULE | Refills: 0 | Status: SHIPPED | OUTPATIENT
Start: 2025-08-01

## 2025-08-01 RX ORDER — PROCHLORPERAZINE EDISYLATE 5 MG/ML
5 INJECTION INTRAMUSCULAR; INTRAVENOUS
Status: CANCELLED | OUTPATIENT
Start: 2025-08-01

## 2025-08-01 RX ORDER — HEPARIN 100 UNIT/ML
500 SYRINGE INTRAVENOUS PRN
Status: DISCONTINUED | OUTPATIENT
Start: 2025-08-01 | End: 2025-08-02 | Stop reason: HOSPADM

## 2025-08-01 RX ORDER — DULOXETIN HYDROCHLORIDE 60 MG/1
60 CAPSULE, DELAYED RELEASE ORAL DAILY
Qty: 90 CAPSULE | Refills: 1 | Status: SHIPPED | OUTPATIENT
Start: 2025-08-08

## 2025-08-01 RX ORDER — SODIUM CHLORIDE 9 MG/ML
5-250 INJECTION, SOLUTION INTRAVENOUS PRN
Status: DISCONTINUED | OUTPATIENT
Start: 2025-08-01 | End: 2025-08-02 | Stop reason: HOSPADM

## 2025-08-01 RX ORDER — EPINEPHRINE 1 MG/ML
0.3 INJECTION, SOLUTION, CONCENTRATE INTRAVENOUS PRN
Status: CANCELLED | OUTPATIENT
Start: 2025-08-01

## 2025-08-01 RX ORDER — DIPHENHYDRAMINE HYDROCHLORIDE 50 MG/ML
50 INJECTION, SOLUTION INTRAMUSCULAR; INTRAVENOUS
Status: CANCELLED | OUTPATIENT
Start: 2025-08-01

## 2025-08-01 RX ORDER — SODIUM CHLORIDE 9 MG/ML
INJECTION, SOLUTION INTRAVENOUS CONTINUOUS
Status: CANCELLED | OUTPATIENT
Start: 2025-08-01

## 2025-08-01 RX ORDER — MEPERIDINE HYDROCHLORIDE 50 MG/ML
12.5 INJECTION INTRAMUSCULAR; INTRAVENOUS; SUBCUTANEOUS PRN
Status: CANCELLED | OUTPATIENT
Start: 2025-08-01

## 2025-08-01 RX ORDER — ACETAMINOPHEN 325 MG/1
1000 TABLET ORAL ONCE
Status: CANCELLED | OUTPATIENT
Start: 2025-08-01 | End: 2025-08-01

## 2025-08-01 RX ORDER — PALONOSETRON 0.05 MG/ML
0.25 INJECTION, SOLUTION INTRAVENOUS ONCE
Status: COMPLETED | OUTPATIENT
Start: 2025-08-01 | End: 2025-08-01

## 2025-08-01 RX ORDER — ALBUTEROL SULFATE 90 UG/1
4 INHALANT RESPIRATORY (INHALATION) PRN
Status: CANCELLED | OUTPATIENT
Start: 2025-08-01

## 2025-08-01 RX ORDER — PALONOSETRON 0.05 MG/ML
0.25 INJECTION, SOLUTION INTRAVENOUS ONCE
Status: CANCELLED | OUTPATIENT
Start: 2025-08-01 | End: 2025-08-01

## 2025-08-01 RX ORDER — DIPHENHYDRAMINE HYDROCHLORIDE 50 MG/ML
50 INJECTION, SOLUTION INTRAMUSCULAR; INTRAVENOUS ONCE
Status: CANCELLED | OUTPATIENT
Start: 2025-08-01 | End: 2025-08-01

## 2025-08-01 RX ORDER — SODIUM CHLORIDE 0.9 % (FLUSH) 0.9 %
5-40 SYRINGE (ML) INJECTION PRN
Status: DISCONTINUED | OUTPATIENT
Start: 2025-08-01 | End: 2025-08-02 | Stop reason: HOSPADM

## 2025-08-01 RX ORDER — HEPARIN SODIUM (PORCINE) LOCK FLUSH IV SOLN 100 UNIT/ML 100 UNIT/ML
500 SOLUTION INTRAVENOUS PRN
Status: CANCELLED | OUTPATIENT
Start: 2025-08-01

## 2025-08-01 RX ADMIN — ACETAMINOPHEN 1000 MG: 500 TABLET ORAL at 10:53

## 2025-08-01 RX ADMIN — SODIUM CHLORIDE 50 ML/HR: 0.9 INJECTION, SOLUTION INTRAVENOUS at 10:54

## 2025-08-01 RX ADMIN — DOXORUBICIN HYDROCHLORIDE 110 MG: 2 INJECTION, SOLUTION INTRAVENOUS at 13:13

## 2025-08-01 RX ADMIN — DEXAMETHASONE SODIUM PHOSPHATE 10 MG: 10 INJECTION INTRAMUSCULAR; INTRAVENOUS at 10:53

## 2025-08-01 RX ADMIN — SODIUM CHLORIDE, PRESERVATIVE FREE 10 ML: 5 INJECTION INTRAVENOUS at 14:02

## 2025-08-01 RX ADMIN — SODIUM CHLORIDE 800 MG: 900 INJECTION, SOLUTION INTRAVENOUS at 11:34

## 2025-08-01 RX ADMIN — DIPHENHYDRAMINE HYDROCHLORIDE 50 MG: 50 INJECTION INTRAMUSCULAR; INTRAVENOUS at 10:53

## 2025-08-01 RX ADMIN — CYCLOPHOSPHAMIDE 1660 MG: 2 INJECTION, POWDER, FOR SOLUTION INTRAVENOUS; ORAL at 13:31

## 2025-08-01 RX ADMIN — HEPARIN 500 UNITS: 100 SYRINGE at 14:02

## 2025-08-01 RX ADMIN — SODIUM CHLORIDE 150 MG: 0.9 INJECTION, SOLUTION INTRAVENOUS at 11:07

## 2025-08-01 RX ADMIN — FAMOTIDINE 20 MG: 10 INJECTION, SOLUTION INTRAVENOUS at 10:53

## 2025-08-01 RX ADMIN — PALONOSETRON 0.25 MG: 0.05 INJECTION, SOLUTION INTRAVENOUS at 10:53

## 2025-08-01 NOTE — PROGRESS NOTES
[facility]  [address] [place] [address] [place] [address]  Phone: [phone]  Fax: [phone]          ROSEMARIE Blanco 44 y.o. White (non-) Unknown     Diagnosis(Copied from Dr. Park's visit note.)   Diffuse large B-cell lymphoma, liver, March 2025  Germinal center type, High grade  Ki-67 of 80%, Non double expressor  FISH: BCL-6 Rearrangement, Negative rearrangement BCL2 and MYC   Stage IV  IPI III     Treatment Summary  5/2/2025 Initiate R-CHOP with Rituxan, Cyclophosphamide, Adriamycin, Vincristine, Prednisone + GCSF every 3 weeks x6 cycles, per recommendations from Dr Bryan Merritt/[facility], treatment consent signed 4/11/25 8/1/25 Discontinue Vincristine due to neuropathy  Current Outpatient Medications   Medication Sig Dispense Refill    DULoxetine (CYMBALTA) 30 MG extended release capsule Take 1 capsule by mouth daily 7 capsule 0    [START ON 8/8/2025] DULoxetine (CYMBALTA) 60 MG extended release capsule Take 1 capsule by mouth daily 90 capsule 1    HYDROcodone-acetaminophen (NORCO) 5-325 MG per tablet Take 1 tablet by mouth every 6 hours as needed for Pain. Max Daily Amount: 4 tablets      HYDROcodone-acetaminophen (NORCO) 5-325 MG per tablet Take 1 tablet by mouth every 6 hours as needed for Pain for up to 30 days. Intended supply: 30 days Max Daily Amount: 4 tablets 120 tablet 0    nystatin (MYCOSTATIN) 952801 UNIT/ML suspension Take 5 mLs by mouth 4 times daily 1 each 1    Magic Mouthwash (MIRACLE MOUTHWASH) Shake Well; For Oral Use. Lidocaine Viscous 2%; 80mL, Diphenhydramine 12.5MG/5Ml; 80mL, ALUM & MAG HYDROXIDE-SIMETH 200-200-20 MG/5ML; 80mL.Swish and spit 4 times daily as needed 480 mL 5    lidocaine-prilocaine (EMLA) 2.5-2.5 % cream Apply topically as needed. 1 each 3    predniSONE (DELTASONE) 50 MG tablet Take 2 tablets daily x5 days with each treatment every 3 weeks x6 cycles 50 tablet 0

## 2025-08-11 DIAGNOSIS — C83.33 DIFFUSE LARGE B-CELL LYMPHOMA OF INTRA-ABDOMINAL LYMPH NODES (HCC): ICD-10-CM

## 2025-08-11 DIAGNOSIS — G89.3 CANCER RELATED PAIN: ICD-10-CM

## 2025-08-11 DIAGNOSIS — C83.33 DIFFUSE LARGE B-CELL LYMPHOMA OF INTRA-ABDOMINAL LYMPH NODES (HCC): Primary | ICD-10-CM

## 2025-08-11 RX ORDER — HYDROCODONE BITARTRATE AND ACETAMINOPHEN 10; 325 MG/1; MG/1
1 TABLET ORAL EVERY 6 HOURS PRN
Qty: 120 TABLET | Refills: 0 | Status: CANCELLED | OUTPATIENT
Start: 2025-08-11 | End: 2025-09-10

## 2025-08-11 RX ORDER — HYDROCODONE BITARTRATE AND ACETAMINOPHEN 10; 325 MG/1; MG/1
1 TABLET ORAL EVERY 6 HOURS PRN
Qty: 120 TABLET | Refills: 0 | Status: SHIPPED | OUTPATIENT
Start: 2025-08-11 | End: 2025-09-10

## 2025-08-22 ENCOUNTER — HOSPITAL ENCOUNTER (OUTPATIENT)
Dept: INFUSION THERAPY | Age: 45
End: 2025-08-22
Payer: MEDICAID

## 2025-08-29 ENCOUNTER — HOSPITAL ENCOUNTER (OUTPATIENT)
Dept: INFUSION THERAPY | Age: 45
Discharge: HOME | End: 2025-08-29
Payer: MEDICAID

## 2025-08-29 ENCOUNTER — OFFICE VISIT (OUTPATIENT)
Dept: HEMATOLOGY | Age: 45
End: 2025-08-29
Payer: MEDICAID

## 2025-08-29 ENCOUNTER — OFFICE VISIT (OUTPATIENT)
Dept: PALLATIVE CARE | Age: 45
End: 2025-08-29
Payer: MEDICAID

## 2025-08-29 VITALS
SYSTOLIC BLOOD PRESSURE: 108 MMHG | WEIGHT: 242.4 LBS | OXYGEN SATURATION: 97 % | DIASTOLIC BLOOD PRESSURE: 77 MMHG | RESPIRATION RATE: 18 BRPM | HEIGHT: 64 IN | HEART RATE: 89 BPM | TEMPERATURE: 97.2 F | BODY MASS INDEX: 41.38 KG/M2

## 2025-08-29 DIAGNOSIS — Z71.89 CARE PLAN DISCUSSED WITH PATIENT: ICD-10-CM

## 2025-08-29 DIAGNOSIS — C83.33 DIFFUSE LARGE B-CELL LYMPHOMA OF INTRA-ABDOMINAL LYMPH NODES (HCC): Primary | ICD-10-CM

## 2025-08-29 DIAGNOSIS — C83.33 DIFFUSE LARGE B-CELL LYMPHOMA OF INTRA-ABDOMINAL LYMPH NODES (HCC): ICD-10-CM

## 2025-08-29 DIAGNOSIS — Z51.11 CHEMOTHERAPY MANAGEMENT, ENCOUNTER FOR: ICD-10-CM

## 2025-08-29 DIAGNOSIS — T45.1X5A CHEMOTHERAPY-INDUCED PERIPHERAL NEUROPATHY: ICD-10-CM

## 2025-08-29 DIAGNOSIS — G62.0 CHEMOTHERAPY-INDUCED NEUROPATHY: ICD-10-CM

## 2025-08-29 DIAGNOSIS — T45.1X5A CHEMOTHERAPY-INDUCED NEUROPATHY: ICD-10-CM

## 2025-08-29 DIAGNOSIS — Z51.12 ENCOUNTER FOR ANTINEOPLASTIC IMMUNOTHERAPY: ICD-10-CM

## 2025-08-29 DIAGNOSIS — G62.0 CHEMOTHERAPY-INDUCED PERIPHERAL NEUROPATHY: ICD-10-CM

## 2025-08-29 DIAGNOSIS — Z51.5 ENCOUNTER FOR PALLIATIVE CARE: ICD-10-CM

## 2025-08-29 DIAGNOSIS — Z45.2 ENCOUNTER FOR CARE RELATED TO PORT-A-CATH: ICD-10-CM

## 2025-08-29 DIAGNOSIS — T45.1X5D ADVERSE EFFECT OF CHEMOTHERAPY, SUBSEQUENT ENCOUNTER: ICD-10-CM

## 2025-08-29 DIAGNOSIS — G62.0 DRUG-INDUCED PERIPHERAL NEUROPATHY: Primary | ICD-10-CM

## 2025-08-29 LAB
ALBUMIN SERPL-MCNC: 4.2 G/DL (ref 3.5–5.2)
ALP SERPL-CCNC: 127 U/L (ref 35–104)
ALT SERPL-CCNC: 24 U/L (ref 5–33)
ANION GAP SERPL CALCULATED.3IONS-SCNC: 12 MMOL/L (ref 7–19)
AST SERPL-CCNC: 23 U/L (ref 5–32)
BASOPHILS # BLD: 0.03 K/UL (ref 0–0.2)
BASOPHILS NFR BLD: 0.5 % (ref 0–1)
BILIRUB SERPL-MCNC: 0.3 MG/DL (ref 0–1.2)
BUN SERPL-MCNC: 14 MG/DL (ref 6–20)
CALCIUM SERPL-MCNC: 9.2 MG/DL (ref 8.6–10)
CHLORIDE SERPL-SCNC: 103 MMOL/L (ref 98–107)
CO2 SERPL-SCNC: 25 MMOL/L (ref 22–29)
CREAT SERPL-MCNC: 0.6 MG/DL (ref 0.5–0.9)
EOSINOPHIL # BLD: 0.04 K/UL (ref 0–0.6)
EOSINOPHIL NFR BLD: 0.7 % (ref 0–5)
ERYTHROCYTE [DISTWIDTH] IN BLOOD BY AUTOMATED COUNT: 13.3 % (ref 11.5–14.5)
GLUCOSE SERPL-MCNC: 140 MG/DL (ref 70–99)
HCT VFR BLD AUTO: 33.3 % (ref 37–47)
HGB BLD-MCNC: 12 G/DL (ref 12–16)
LYMPHOCYTES # BLD: 0.87 K/UL (ref 1.1–4.5)
LYMPHOCYTES NFR BLD: 15.2 % (ref 20–40)
MAGNESIUM SERPL-MCNC: 1.9 MG/DL (ref 1.6–2.6)
MCH RBC QN AUTO: 34.3 PG (ref 27–31)
MCHC RBC AUTO-ENTMCNC: 36 G/DL (ref 33–37)
MCV RBC AUTO: 95.1 FL (ref 81–99)
MONOCYTES # BLD: 0.55 K/UL (ref 0–0.9)
MONOCYTES NFR BLD: 9.6 % (ref 1–10)
NEUTROPHILS # BLD: 4.22 K/UL (ref 1.5–7.5)
NEUTS SEG NFR BLD: 73.7 % (ref 50–65)
PHOSPHATE SERPL-MCNC: 3.4 MG/DL (ref 2.5–4.5)
PLATELET # BLD AUTO: 353 K/UL (ref 130–400)
PMV BLD AUTO: 9 FL (ref 9.4–12.3)
POTASSIUM SERPL-SCNC: 3.6 MMOL/L (ref 3.5–5.1)
PROT SERPL-MCNC: 6.8 G/DL (ref 6.4–8.3)
RBC # BLD AUTO: 3.5 M/UL (ref 4.2–5.4)
SODIUM SERPL-SCNC: 140 MMOL/L (ref 136–145)
WBC # BLD AUTO: 5.73 K/UL (ref 4.8–10.8)

## 2025-08-29 PROCEDURE — 99215 OFFICE O/P EST HI 40 MIN: CPT

## 2025-08-29 PROCEDURE — 2580000003 HC RX 258: Performed by: INTERNAL MEDICINE

## 2025-08-29 PROCEDURE — 96375 TX/PRO/DX INJ NEW DRUG ADDON: CPT

## 2025-08-29 PROCEDURE — 36415 COLL VENOUS BLD VENIPUNCTURE: CPT

## 2025-08-29 PROCEDURE — 96415 CHEMO IV INFUSION ADDL HR: CPT

## 2025-08-29 PROCEDURE — 2500000003 HC RX 250 WO HCPCS: Performed by: INTERNAL MEDICINE

## 2025-08-29 PROCEDURE — 6370000000 HC RX 637 (ALT 250 FOR IP): Performed by: INTERNAL MEDICINE

## 2025-08-29 PROCEDURE — G8417 CALC BMI ABV UP PARAM F/U: HCPCS

## 2025-08-29 PROCEDURE — 6360000002 HC RX W HCPCS: Performed by: INTERNAL MEDICINE

## 2025-08-29 PROCEDURE — 83735 ASSAY OF MAGNESIUM: CPT

## 2025-08-29 PROCEDURE — 85025 COMPLETE CBC W/AUTO DIFF WBC: CPT

## 2025-08-29 PROCEDURE — 96367 TX/PROPH/DG ADDL SEQ IV INF: CPT

## 2025-08-29 PROCEDURE — G8428 CUR MEDS NOT DOCUMENT: HCPCS

## 2025-08-29 PROCEDURE — 96417 CHEMO IV INFUS EACH ADDL SEQ: CPT

## 2025-08-29 PROCEDURE — 96413 CHEMO IV INFUSION 1 HR: CPT

## 2025-08-29 PROCEDURE — 80053 COMPREHEN METABOLIC PANEL: CPT

## 2025-08-29 PROCEDURE — 96411 CHEMO IV PUSH ADDL DRUG: CPT

## 2025-08-29 PROCEDURE — 84100 ASSAY OF PHOSPHORUS: CPT

## 2025-08-29 PROCEDURE — 4004F PT TOBACCO SCREEN RCVD TLK: CPT

## 2025-08-29 RX ORDER — SODIUM CHLORIDE 9 MG/ML
INJECTION, SOLUTION INTRAVENOUS CONTINUOUS
OUTPATIENT
Start: 2025-08-29

## 2025-08-29 RX ORDER — FAMOTIDINE 10 MG/ML
20 INJECTION, SOLUTION INTRAVENOUS ONCE
Status: CANCELLED
Start: 2025-08-29 | End: 2025-08-29

## 2025-08-29 RX ORDER — ACETAMINOPHEN 325 MG/1
650 TABLET ORAL
OUTPATIENT
Start: 2025-08-29

## 2025-08-29 RX ORDER — MEPERIDINE HYDROCHLORIDE 50 MG/ML
12.5 INJECTION INTRAMUSCULAR; INTRAVENOUS; SUBCUTANEOUS PRN
OUTPATIENT
Start: 2025-08-29

## 2025-08-29 RX ORDER — DIPHENHYDRAMINE HYDROCHLORIDE 50 MG/ML
50 INJECTION, SOLUTION INTRAMUSCULAR; INTRAVENOUS ONCE
Status: COMPLETED | OUTPATIENT
Start: 2025-08-29 | End: 2025-08-29

## 2025-08-29 RX ORDER — SODIUM CHLORIDE 0.9 % (FLUSH) 0.9 %
5-40 SYRINGE (ML) INJECTION PRN
OUTPATIENT
Start: 2025-08-29

## 2025-08-29 RX ORDER — HYDROCORTISONE SODIUM SUCCINATE 100 MG/2ML
100 INJECTION INTRAMUSCULAR; INTRAVENOUS
OUTPATIENT
Start: 2025-08-29

## 2025-08-29 RX ORDER — HEPARIN 100 UNIT/ML
500 SYRINGE INTRAVENOUS PRN
Status: DISCONTINUED | OUTPATIENT
Start: 2025-08-29 | End: 2025-08-30 | Stop reason: HOSPADM

## 2025-08-29 RX ORDER — DIPHENHYDRAMINE HYDROCHLORIDE 50 MG/ML
50 INJECTION, SOLUTION INTRAMUSCULAR; INTRAVENOUS
OUTPATIENT
Start: 2025-08-29

## 2025-08-29 RX ORDER — DEXAMETHASONE SODIUM PHOSPHATE 10 MG/ML
10 INJECTION, SOLUTION INTRAMUSCULAR; INTRAVENOUS
Status: COMPLETED | OUTPATIENT
Start: 2025-08-29 | End: 2025-08-29

## 2025-08-29 RX ORDER — FAMOTIDINE 10 MG/ML
20 INJECTION, SOLUTION INTRAVENOUS ONCE
Status: COMPLETED | OUTPATIENT
Start: 2025-08-29 | End: 2025-08-29

## 2025-08-29 RX ORDER — HEPARIN 100 UNIT/ML
500 SYRINGE INTRAVENOUS PRN
OUTPATIENT
Start: 2025-08-29

## 2025-08-29 RX ORDER — HEPARIN SODIUM (PORCINE) LOCK FLUSH IV SOLN 100 UNIT/ML 100 UNIT/ML
500 SOLUTION INTRAVENOUS PRN
Status: CANCELLED | OUTPATIENT
Start: 2025-08-29

## 2025-08-29 RX ORDER — ALBUTEROL SULFATE 90 UG/1
4 INHALANT RESPIRATORY (INHALATION) PRN
OUTPATIENT
Start: 2025-08-29

## 2025-08-29 RX ORDER — SODIUM CHLORIDE 9 MG/ML
5-250 INJECTION, SOLUTION INTRAVENOUS PRN
Status: CANCELLED | OUTPATIENT
Start: 2025-08-29

## 2025-08-29 RX ORDER — ACETAMINOPHEN 325 MG/1
1000 TABLET ORAL ONCE
Status: CANCELLED | OUTPATIENT
Start: 2025-08-29 | End: 2025-08-29

## 2025-08-29 RX ORDER — SODIUM CHLORIDE 0.9 % (FLUSH) 0.9 %
5-40 SYRINGE (ML) INJECTION PRN
Status: DISCONTINUED | OUTPATIENT
Start: 2025-08-29 | End: 2025-08-30 | Stop reason: HOSPADM

## 2025-08-29 RX ORDER — SODIUM CHLORIDE 9 MG/ML
5-250 INJECTION, SOLUTION INTRAVENOUS PRN
Status: DISCONTINUED | OUTPATIENT
Start: 2025-08-29 | End: 2025-08-30 | Stop reason: HOSPADM

## 2025-08-29 RX ORDER — PALONOSETRON 0.05 MG/ML
0.25 INJECTION, SOLUTION INTRAVENOUS ONCE
Status: CANCELLED | OUTPATIENT
Start: 2025-08-29 | End: 2025-08-29

## 2025-08-29 RX ORDER — PROCHLORPERAZINE EDISYLATE 5 MG/ML
5 INJECTION INTRAMUSCULAR; INTRAVENOUS
OUTPATIENT
Start: 2025-08-29

## 2025-08-29 RX ORDER — ACETAMINOPHEN 500 MG
1000 TABLET ORAL ONCE
Status: COMPLETED | OUTPATIENT
Start: 2025-08-29 | End: 2025-08-29

## 2025-08-29 RX ORDER — GABAPENTIN 100 MG/1
100 CAPSULE ORAL EVERY 12 HOURS
Qty: 60 CAPSULE | Refills: 0 | Status: SHIPPED | OUTPATIENT
Start: 2025-08-29 | End: 2026-02-25

## 2025-08-29 RX ORDER — PALONOSETRON 0.05 MG/ML
0.25 INJECTION, SOLUTION INTRAVENOUS ONCE
Status: COMPLETED | OUTPATIENT
Start: 2025-08-29 | End: 2025-08-29

## 2025-08-29 RX ORDER — ONDANSETRON 2 MG/ML
8 INJECTION INTRAMUSCULAR; INTRAVENOUS
OUTPATIENT
Start: 2025-08-29

## 2025-08-29 RX ORDER — FAMOTIDINE 10 MG/ML
20 INJECTION, SOLUTION INTRAVENOUS
OUTPATIENT
Start: 2025-08-29

## 2025-08-29 RX ORDER — DIPHENHYDRAMINE HYDROCHLORIDE 50 MG/ML
50 INJECTION, SOLUTION INTRAMUSCULAR; INTRAVENOUS ONCE
Status: CANCELLED | OUTPATIENT
Start: 2025-08-29 | End: 2025-08-29

## 2025-08-29 RX ORDER — SODIUM CHLORIDE 9 MG/ML
25 INJECTION, SOLUTION INTRAVENOUS PRN
OUTPATIENT
Start: 2025-08-29

## 2025-08-29 RX ORDER — SODIUM CHLORIDE 9 MG/ML
5-250 INJECTION, SOLUTION INTRAVENOUS PRN
OUTPATIENT
Start: 2025-08-29

## 2025-08-29 RX ORDER — EPINEPHRINE 1 MG/ML
0.3 INJECTION, SOLUTION, CONCENTRATE INTRAVENOUS PRN
OUTPATIENT
Start: 2025-08-29

## 2025-08-29 RX ORDER — SODIUM CHLORIDE 0.9 % (FLUSH) 0.9 %
5-40 SYRINGE (ML) INJECTION PRN
Status: CANCELLED | OUTPATIENT
Start: 2025-08-29

## 2025-08-29 RX ADMIN — DEXAMETHASONE SODIUM PHOSPHATE 10 MG: 10 INJECTION, SOLUTION INTRAMUSCULAR; INTRAVENOUS at 11:34

## 2025-08-29 RX ADMIN — CYCLOPHOSPHAMIDE 1660 MG: 2 INJECTION, POWDER, FOR SOLUTION INTRAVENOUS; ORAL at 14:35

## 2025-08-29 RX ADMIN — SODIUM CHLORIDE 25 ML/HR: 0.9 INJECTION, SOLUTION INTRAVENOUS at 11:34

## 2025-08-29 RX ADMIN — PALONOSETRON 0.25 MG: 0.05 INJECTION, SOLUTION INTRAVENOUS at 11:34

## 2025-08-29 RX ADMIN — HEPARIN 500 UNITS: 100 SYRINGE at 15:09

## 2025-08-29 RX ADMIN — ACETAMINOPHEN 1000 MG: 500 TABLET ORAL at 11:33

## 2025-08-29 RX ADMIN — FAMOTIDINE 20 MG: 10 INJECTION, SOLUTION INTRAVENOUS at 11:34

## 2025-08-29 RX ADMIN — DIPHENHYDRAMINE HYDROCHLORIDE 50 MG: 50 INJECTION INTRAMUSCULAR; INTRAVENOUS at 11:34

## 2025-08-29 RX ADMIN — SODIUM CHLORIDE, PRESERVATIVE FREE 10 ML: 5 INJECTION INTRAVENOUS at 15:09

## 2025-08-29 RX ADMIN — SODIUM CHLORIDE 150 MG: 0.9 INJECTION, SOLUTION INTRAVENOUS at 12:03

## 2025-08-29 RX ADMIN — DOXORUBICIN HYDROCHLORIDE 110 MG: 2 INJECTION, SOLUTION INTRAVENOUS at 14:19

## 2025-08-29 RX ADMIN — SODIUM CHLORIDE 800 MG: 0.9 INJECTION, SOLUTION INTRAVENOUS at 12:30

## 2025-09-02 ENCOUNTER — TELEPHONE (OUTPATIENT)
Dept: HEMATOLOGY | Age: 45
End: 2025-09-02

## (undated) DEVICE — SUT MONOCRYL 4/0 PS2 27IN Y426H ETY426H

## (undated) DEVICE — PK MAJ PROC LF 60

## (undated) DEVICE — SUT VIC 3/0 CTI 36IN J944H

## (undated) DEVICE — DRSNG TELFA PAD NONADH STR 1S 3X8IN

## (undated) DEVICE — SOL IRR NACL 0.9PCT BT 1000ML

## (undated) DEVICE — SHEET,DRAPE,53X77,STERILE: Brand: MEDLINE

## (undated) DEVICE — SYR LL TP 10ML STRL

## (undated) DEVICE — GLV SURG SENSICARE GREEN W/ALOE PF LF 8 STRL

## (undated) DEVICE — DRAPE,UTILITY,XL,4/PK,STERILE: Brand: MEDLINE

## (undated) DEVICE — GLOVE SURG SZ 8 L12IN FNGR THK79MIL GRN LTX FREE

## (undated) DEVICE — GLOVE SURG SZ 75 CRM LTX FREE POLYISOPRENE POLYMER BEAD ANTI

## (undated) DEVICE — GOWN,PREVENTION PLUS,XLNG/XXLARGE,STRL: Brand: MEDLINE

## (undated) DEVICE — GLV SURG SENSICARE PI PF LF 7 GRN STRL

## (undated) DEVICE — TOTAL TRAY, 16FR 10ML SIL FOLEY, URN: Brand: MEDLINE

## (undated) DEVICE — MONOPOLAR METZENBAUM SCISSOR TIP, DISPOSABLE: Brand: MONOPOLAR METZENBAUM SCISSOR TIP, DISPOSABLE

## (undated) DEVICE — INTENDED FOR TISSUE SEPARATION, AND OTHER PROCEDURES THAT REQUIRE A SHARP SURGICAL BLADE TO PUNCTURE OR CUT.: Brand: BARD-PARKER ® CARBON RIB-BACK BLADES

## (undated) DEVICE — SUT ETHLN 4/0 P3 18IN 699H

## (undated) DEVICE — WRAP LEG 31X48X6IN STRL

## (undated) DEVICE — DRAPE,U/ SHT,SPLIT,PLAS,STERIL: Brand: MEDLINE

## (undated) DEVICE — BNDG ADHS CURAD FLX/FABRC 2X4IN STRL LF

## (undated) DEVICE — YANKAUER SUCTION INSTRUMENT WITH ON/OFF CONTROL, BULB TIP, CLEAR: Brand: YANKAUER

## (undated) DEVICE — DISPOSABLE BIPOLAR CODE, 12' (3.66 M): Brand: CONMED

## (undated) DEVICE — LIQUIBAND RAPID ADHESIVE 36/CS 0.8ML: Brand: MEDLINE

## (undated) DEVICE — DRSNG GZ CURAD XEROFORM NONADHS 5X9IN STRL

## (undated) DEVICE — PK LAP GYN 60

## (undated) DEVICE — UNDRPD BREATH 23X36 BG/10

## (undated) DEVICE — STERILE POLYISOPRENE POWDER-FREE SURGICAL GLOVES WITH EMOLLIENT COATING: Brand: PROTEXIS

## (undated) DEVICE — SUTURE VICRYL + SZ 3-0 L27IN ABSRB UD L26MM SH 1/2 CIR VCP416H

## (undated) DEVICE — PACK,UNIVERSAL,NO GOWNS: Brand: MEDLINE

## (undated) DEVICE — GLV SURG SENSICARE POLYISPRN W/ALOE PF LF 6.5 GRN STRL

## (undated) DEVICE — WEBRIL* CAST PADDING: Brand: DEROYAL

## (undated) DEVICE — GLV SURG SENSICARE GREEN W/ALOE PF LF 8.5 STRL

## (undated) DEVICE — PAD,ABDOMINAL,8"X10",ST,LF: Brand: MEDLINE

## (undated) DEVICE — GLV SURG SENSICARE ALOE LF PF SZ7.5 GRN

## (undated) DEVICE — SUT VIC 3/0 SH 27IN J416H

## (undated) DEVICE — SUT GUT CHRM 2/0 SH 27IN G123H

## (undated) DEVICE — MINOR CDS: Brand: MEDLINE INDUSTRIES, INC.

## (undated) DEVICE — SUT PDS LP 0 CTX VIO 60IN Z990G

## (undated) DEVICE — GAUZE,SPONGE,FLUFF,6"X6.75",STRL,5/TRAY: Brand: MEDLINE

## (undated) DEVICE — GLV SURG TRIUMPH LT PF LTX 7 STRL

## (undated) DEVICE — NEPTUNE E-SEP SMOKE EVACUATION PENCIL, COATED, 70MM BLADE, ROCKER SWITCH: Brand: NEPTUNE E-SEP

## (undated) DEVICE — PK EXTRM LF 60

## (undated) DEVICE — GLV SURG TRIUMPH NATURAL W/ALOE PF LTX 7.5 STRL

## (undated) DEVICE — GLV SURG SENSICARE MICRO PF LF 7.5 STRL

## (undated) DEVICE — DRAINBAG,ANTI-REFLUX TOWER,L/F,2000ML,LL: Brand: MEDLINE

## (undated) DEVICE — LIGHT HANDLE: Brand: DEVON

## (undated) DEVICE — DISPOSABLE TOURNIQUET CUFF SINGLE BLADDER, DUAL PORT AND QUICK CONNECT CONNECTOR: Brand: COLOR CUFF

## (undated) DEVICE — SUT VIC 0 CT1 36IN J946H

## (undated) DEVICE — BANDAGE,GAUZE,BULKEE II,4.5"X4.1YD,STRL: Brand: MEDLINE

## (undated) DEVICE — BNDG ELAS ELITE V/CLOSE 4IN 5YD LF STRL

## (undated) DEVICE — CONTAINER,SPECIMEN,OR STERILE,4OZ: Brand: MEDLINE

## (undated) DEVICE — CATH FOL LUBRISIL IC 3WY 18F 5CC

## (undated) DEVICE — SUT VIC 2/0 TIES 18IN J111T

## (undated) DEVICE — APPL CHLORAPREP W/TINT 26ML ORNG

## (undated) DEVICE — SAFESECURE,SECUREMENT,FOLEY CATH,STERILE: Brand: MEDLINE

## (undated) DEVICE — SOL IRR H2O BTL 1000ML STRL

## (undated) DEVICE — MARKR SKIN W/RULR AND LBL

## (undated) DEVICE — SPNG LAP 18X18IN LF STRL PK/5

## (undated) DEVICE — DRSNG WND BORDR/ADHS NONADHR/GZ LF 4X10IN STRL

## (undated) DEVICE — ENSEAL LAPAROSCOPIC TISSUE SEALER G2 ARTICULATING  CURVED JAW FOR USE WITH G2 GENERATOR 5MM DIAMETER 35CM SHAFT LENGTH: Brand: ENSEAL

## (undated) DEVICE — SUTURE MONOCRYL SZ 4-0 L18IN ABSRB UD L19MM PS-2 3/8 CIR PRIM Y496G

## (undated) DEVICE — SUT VICRYL O M0-4 27IN ETHCPP71D

## (undated) DEVICE — ENSEAL 20 CM SHAFT, LARGE JAW: Brand: ENSEAL X1

## (undated) DEVICE — SUTURE PROL SZ 2-0 L36IN NONABSORBABLE BLU V-7 L26MM 1/2 8977H

## (undated) DEVICE — PROVE COVER: Brand: UNBRANDED

## (undated) DEVICE — DRSNG WND GZ CURAD OIL EMULSION 3X8IN STRL PK/3

## (undated) DEVICE — PAD UNDERCAST WYTEX 4IN 4YD LF STRL

## (undated) DEVICE — ENDOPATH PNEUMONEEDLE INSUFFLATION NEEDLES WITH LUER LOCK CONNECTORS 120MM: Brand: ENDOPATH

## (undated) DEVICE — GOWN, ORBIS, XXLARGE, STERILE: Brand: MEDLINE

## (undated) DEVICE — DRP SURG U/BUTT W/PCH BACK STRL

## (undated) DEVICE — NDL HYPO SFTY GLD 22G 1 1/2IN

## (undated) DEVICE — SYS CLS PORTSITE CT CLOSESURE 5AND10/12

## (undated) DEVICE — GLV SURG SENSICARE PI LF PF 7.5 GRN STRL

## (undated) DEVICE — SYS SKIN CLS DERMABOND PRINEO W/22CM MESH TP

## (undated) DEVICE — CONMED ACCESSORY ELECTRODE, NEEDLE ELECTRODE

## (undated) DEVICE — SPNG GZ WOVN 4X4IN 12PLY 10/BX STRL

## (undated) DEVICE — DRAPE C ARM W42XL120IN W POLY STRP W OUT LENS

## (undated) DEVICE — GLV SURG TRIUMPH ORTHO W/ALOE PF LTX 8 STRL

## (undated) DEVICE — BLD SCLPL BEAVR MINI STR 2BVL 180D LF

## (undated) DEVICE — SKIN AFFIX SURG ADHESIVE 72/CS 0.55ML: Brand: MEDLINE

## (undated) DEVICE — CYSTO/BLADDER IRRIGATION SET, REGULATING CLAMP